# Patient Record
Sex: FEMALE | Race: BLACK OR AFRICAN AMERICAN | Employment: UNEMPLOYED | URBAN - METROPOLITAN AREA
[De-identification: names, ages, dates, MRNs, and addresses within clinical notes are randomized per-mention and may not be internally consistent; named-entity substitution may affect disease eponyms.]

---

## 2016-12-19 LAB
CHLAMYDIA, EXTERNAL: NEGATIVE
N. GONORRHEA, EXTERNAL: NEGATIVE

## 2017-01-16 LAB
HBSAG, EXTERNAL: NEGATIVE
HIV, EXTERNAL: NEGATIVE
RUBELLA, EXTERNAL: NORMAL

## 2017-02-06 ENCOUNTER — ROUTINE PRENATAL (OUTPATIENT)
Dept: MIDWIFE SERVICES | Age: 27
End: 2017-02-06

## 2017-02-06 VITALS
BODY MASS INDEX: 32.27 KG/M2 | HEIGHT: 64 IN | WEIGHT: 189 LBS | HEART RATE: 95 BPM | TEMPERATURE: 98.3 F | DIASTOLIC BLOOD PRESSURE: 81 MMHG | SYSTOLIC BLOOD PRESSURE: 127 MMHG

## 2017-02-06 DIAGNOSIS — Z34.02 ENCOUNTER FOR SUPERVISION OF NORMAL FIRST PREGNANCY IN SECOND TRIMESTER: Primary | ICD-10-CM

## 2017-02-06 PROBLEM — E55.9 VITAMIN D DEFICIENCY: Status: ACTIVE | Noted: 2017-02-06

## 2017-02-06 NOTE — PROGRESS NOTES
S: Feeling well. No significant complaints or concerns. Has not felt movement yet. No ctxs, LOF, or vaginal bldng. Nausea is getting much better. Has not been able to get into office for 7 weeks now. Her children have been sick. Every day she was scheduled, she had a child throwing up and had to cancel. She does not think there will be a problem getting in to the office regularly in the future. O: See prenatal flowsheet for maternal and fetal exam  Blood pressure 127/81, pulse 95, temperature 98.3 °F (36.8 °C), temperature source Oral, height 5' 4\" (1.626 m), weight 189 lb (85.7 kg), last menstrual period 10/10/2016, not currently breastfeeding. A:G10   17w0d   Size=Dates    P: Reviewed labs  20 week US scheduled  Discussed chronic hypertension/preeclampsia   Encouraged diet high in protein and to limit weight gain to 25 lbs  Encouraged exercise  Reviewed common pregnancy changes and discomfort as well as comfort measures.   See prenatal checklist for other topics covered during visit today  RTO in 4 weeks for CLAUDINE with KEVIN

## 2017-02-06 NOTE — MR AVS SNAPSHOT
Visit Information Date & Time Provider Department Dept. Phone Encounter #  
 2/6/2017 10:30 AM Sen Perez 6 [id number] Upcoming Health Maintenance Date Due  
 PAP AKA CERVICAL CYTOLOGY 1/11/2009 INFLUENZA AGE 9 TO ADULT 8/1/2016 Allergies as of 2/6/2017  Review Complete On: 12/19/2016 By: Patty Dancer, CNM No Known Allergies Current Immunizations  Never Reviewed Name Date Tdap 8/2/2014 11:03 AM  
  
 Not reviewed this visit Vitals BP Pulse Temp Height(growth percentile) Weight(growth percentile) LMP  
 127/81 95 98.3 °F (36.8 °C) (Oral) 5' 4\" (1.626 m) 189 lb (85.7 kg) 10/10/2016 (Approximate) Breastfeeding? BMI OB Status Smoking Status No 32.44 kg/m2 Having regular periods Never Smoker BMI and BSA Data Body Mass Index Body Surface Area  
 32.44 kg/m 2 1.97 m 2 Your Updated Medication List  
  
   
This list is accurate as of: 2/6/17 11:25 AM.  Always use your most recent med list.  
  
  
  
  
 aspirin 81 mg chewable tablet Take 81 mg by mouth daily. ibuprofen 800 mg tablet Commonly known as:  MOTRIN Take 1 Tab by mouth every eight (8) hours. * PRENATAL DHA+COMPLETE PRENATAL -300 mg-mcg-mg Cmpk Generic drug:  PNV no.24-iron-folic acid-dha Take 30 mg/day by mouth daily. Indications: PREGNANCY  
  
 * PRENATAL DHA+COMPLETE PRENATAL -300 mg-mcg-mg Cmpk Generic drug:  PNV no.24-iron-folic acid-dha Take  by mouth. * Notice: This list has 2 medication(s) that are the same as other medications prescribed for you. Read the directions carefully, and ask your doctor or other care provider to review them with you. Patient Instructions Vit D3 4,000 international units daily. Take with your largest meal. 
 
  
Introducing [facility] & HEALTH SERVICES!    
 Breanne Verduzco introduces For[employer] patient portal. Now you can access parts of your medical record, email your doctor's office, and request medication refills online. 1. In your internet browser, go to [email or web address] Xooker/[facility] 2. Click on the First Time User? Click Here link in the Sign In box. You will see the New Member Sign Up page. 3. Enter your 365Scores Access Code exactly as it appears below. You will not need to use this code after youve completed the sign-up process. If you do not sign up before the expiration date, you must request a new code. · [employer] Access Code: [email or web address] Expires: 5/7/2017 11:21 AM 
 
4. Enter the last four digits of your Social Security Number (xxxx) and Date of Birth (mm/dd/yyyy) as indicated and click Submit. You will be taken to the next sign-up page. 5. Create a 365Scores ID. This will be your 365Scores login ID and cannot be changed, so think of one that is secure and easy to remember. 6. Create a [employer]Scores password. You can change your password at any time. 7. Enter your Password Reset Question and Answer. This can be used at a later time if you forget your password. 8. Enter your e-mail address. You will receive e-mail notification when new information is available in [address] 9. Click Sign Up. You can now view and download portions of your medical record. 10. Click the Download Summary menu link to download a portable copy of your medical information. If you have questions, please visit the Frequently Asked Questions section of the [employer] website. Remember, 365Scores is NOT to be used for urgent needs. For medical emergencies, dial 911. Now available from your iPhone and Android! Please provide this summary of care documentation to your next provider. Your primary care clinician is listed as [place]. If you have any questions after today's visit, please call [phone].

## 2017-02-06 NOTE — PROGRESS NOTES
Chief Complaint   Patient presents with    Routine Prenatal Visit     17w0d     Visit Vitals    /81    Pulse 95    Temp 98.3 °F (36.8 °C) (Oral)    Ht 5' 4\" (1.626 m)    Wt 189 lb (85.7 kg)    Breastfeeding No    BMI 32.44 kg/m2     1. Have you been to the ER, urgent care clinic since your last visit? Hospitalized since your last visit? No    2. Have you seen or consulted any other health care providers outside of the [address] [place] [address] since your last visit? Include any pap smears or colon screening.  No

## 2017-03-06 ENCOUNTER — ROUTINE PRENATAL (OUTPATIENT)
Dept: MIDWIFE SERVICES | Age: 27
End: 2017-03-06

## 2017-03-06 ENCOUNTER — HOSPITAL ENCOUNTER (OUTPATIENT)
Dept: PERINATAL CARE | Age: 27
Discharge: HOME | End: 2017-03-06
Attending: OBSTETRICS & GYNECOLOGY
Payer: MEDICAID

## 2017-03-06 VITALS
WEIGHT: 195 LBS | TEMPERATURE: 98 F | HEIGHT: 64 IN | DIASTOLIC BLOOD PRESSURE: 82 MMHG | BODY MASS INDEX: 33.29 KG/M2 | HEART RATE: 87 BPM | SYSTOLIC BLOOD PRESSURE: 133 MMHG

## 2017-03-06 DIAGNOSIS — Z34.80 ENCOUNTER FOR SUPERVISION OF OTHER NORMAL PREGNANCY: Primary | ICD-10-CM

## 2017-03-06 DIAGNOSIS — O09.291 HX OF PRE-ECLAMPSIA IN PRIOR PREGNANCY, CURRENTLY PREGNANT, FIRST TRIMESTER: ICD-10-CM

## 2017-03-06 DIAGNOSIS — O10.011 BENIGN ESSENTIAL HTN, CHRONIC, ANTEPARTUM, FIRST TRIMESTER: ICD-10-CM

## 2017-03-06 PROCEDURE — 76805 OB US >/= 14 WKS SNGL FETUS: CPT | Performed by: OBSTETRICS & GYNECOLOGY

## 2017-03-06 RX ORDER — CHOLECALCIFEROL (VITAMIN D3) 125 MCG
4000 CAPSULE ORAL DAILY
COMMUNITY
End: 2017-09-11 | Stop reason: ALTCHOICE

## 2017-03-06 NOTE — PROGRESS NOTES
Chief Complaint   Patient presents with    Routine Prenatal Visit     21w0d     Visit Vitals    /82    Pulse 87    Temp 98 °F (36.7 °C) (Oral)    Ht 5' 4\" (1.626 m)    Wt 195 lb (88.5 kg)    BMI 33.47 kg/m2     1. Have you been to the ER, urgent care clinic since your last visit? Hospitalized since your last visit? No    2. Have you seen or consulted any other health care providers outside of the [address] [place] [address] since your last visit? Include any pap smears or colon screening.  No

## 2017-03-06 NOTE — PROGRESS NOTES
S: Feeling well. No significant complaints or concerns. Reports consistent, daily fetal mvmt. No ctxs, LOF, or vaginal bldng. O: See prenatal flowsheet for maternal and fetal exam  Blood pressure 133/82, pulse 87, temperature 98 °F (36.7 °C), temperature source Oral, height 5' 4\" (1.626 m), weight 195 lb (88.5 kg), last menstrual period 10/10/2016, is currently breastfeeding on occasion, on demand with 3year old. Continues taking daily baby ASA for hx of pre-e. Has anatomy scan following appointment today. A:G7   21w0d   Size=Dates    P: Breast pump order to be given at later visit.   See prenatal checklist for other topics covered during today's visit  RTO in 4  weeks for CLAUDINE with KEVIN

## 2017-03-06 NOTE — MR AVS SNAPSHOT
Visit Information Date & Time Provider Department Dept. Phone Encounter #  
 3/6/2017  1:00 PM Priscilla HardySen 6 [id number] Your Appointments 4/3/2017 10:30 AM  
OB VISIT with Dylan Banuelos CNM [address] ([facility]) Appt Note: ret ob  
 [address] [place] [address] [facility]  
[phone]  
  
   
 [address] [facility] [address] [facility] [address] Upcoming Health Maintenance Date Due  
 PAP AKA CERVICAL CYTOLOGY 1/11/2009 INFLUENZA AGE 9 TO ADULT 8/1/2016 Allergies as of 3/6/2017  Review Complete On: 3/6/2017 By: Priscilla Hardy CNM No Known Allergies Current Immunizations  Never Reviewed Name Date Tdap 8/2/2014 11:03 AM  
  
 Not reviewed this visit Vitals BP Pulse Temp Height(growth percentile) Weight(growth percentile) LMP  
 133/82 87 98 °F (36.7 °C) (Oral) 5' 4\" (1.626 m) 195 lb (88.5 kg) 10/10/2016 (Approximate) BMI OB Status Smoking Status 33.47 kg/m2 Pregnant Never Smoker BMI and BSA Data Body Mass Index Body Surface Area  
 33.47 kg/m 2 2 m 2 Your Updated Medication List  
  
   
This list is accurate as of: 3/6/17  1:36 PM.  Always use your most recent med list.  
  
  
  
  
 aspirin 81 mg chewable tablet Take 81 mg by mouth daily. * PRENATAL DHA+COMPLETE PRENATAL -300 mg-mcg-mg Cmpk Generic drug:  PNV no.24-iron-folic acid-dha Take 30 mg/day by mouth daily. Indications: PREGNANCY  
  
 * PRENATAL DHA+COMPLETE PRENATAL -300 mg-mcg-mg Cmpk Generic drug:  PNV no.24-iron-folic acid-dha Take  by mouth. VITAMIN D3 2,000 unit Tab Generic drug:  cholecalciferol (vitamin D3) Take 4,000 Int'l Units by mouth daily. * Notice: This list has 2 medication(s) that are the same as other medications prescribed for you.  Read the directions carefully, and ask your doctor or other care provider to review them with you. Introducing [facility] & HEALTH SERVICES! Cecilio Kwong introduces H[employer]ize patient portal. Now you can access parts of your medical record, email your doctor's office, and request medication refills online. 1. In your internet browser, go to [email or web address] Proxsys/[email or web address] 2. Click on the First Time User? Click Here link in the Sign In box. You will see the New Member Sign Up page. 3. Enter your Hatsize Access Code exactly as it appears below. You will not need to use this code after youve completed the sign-up process. If you do not sign up before the expiration date, you must request a new code. · Hatsize Access Code: [email or web address] Expires: 5/7/2017 11:21 AM 
 
4. Enter the last four digits of your Social Security Number (xxxx) and Date of Birth (mm/dd/yyyy) as indicated and click Submit. You will be taken to the next sign-up page. 5. Create a Hatsize ID. This will be your Hatsize login ID and cannot be changed, so think of one that is secure and easy to remember. 6. Create a Hatsize password. You can change your password at any time. 7. Enter your Password Reset Question and Answer. This can be used at a later time if you forget your password. 8. Enter your e-mail address. You will receive e-mail notification when new information is available in [address] 9. Click Sign Up. You can now view and download portions of your medical record. 10. Click the Download Summary menu link to download a portable copy of your medical information. If you have questions, please visit the Frequently Asked Questions section of the Hatsize website. Remember, Hatsize is NOT to be used for urgent needs. For medical emergencies, dial 911. Now available from your iPhone and Android! Please provide this summary of care documentation to your next provider. Your primary care clinician is listed as Javier Avendano.  If you have any questions after today's visit, please call [phone].

## 2017-03-23 NOTE — PROGRESS NOTES
I recommend we reassign her DAVID to 07/21/17. Recommendations: Follow-up scans as clinically indicated.

## 2017-04-10 ENCOUNTER — ROUTINE PRENATAL (OUTPATIENT)
Dept: MIDWIFE SERVICES | Age: 27
End: 2017-04-10

## 2017-04-10 VITALS
DIASTOLIC BLOOD PRESSURE: 82 MMHG | HEIGHT: 64 IN | SYSTOLIC BLOOD PRESSURE: 129 MMHG | WEIGHT: 205 LBS | BODY MASS INDEX: 35 KG/M2 | TEMPERATURE: 98.4 F

## 2017-04-10 DIAGNOSIS — Z34.82 ENCOUNTER FOR SUPERVISION OF OTHER NORMAL PREGNANCY IN SECOND TRIMESTER: Primary | ICD-10-CM

## 2017-04-10 NOTE — MR AVS SNAPSHOT
Visit Information Date & Time Provider Department Dept. Phone Encounter #  
 4/10/2017 10:00 AM Sen Carey [id number] Upcoming Health Maintenance Date Due  
 PAP AKA CERVICAL CYTOLOGY 1/11/2009 INFLUENZA AGE 9 TO ADULT 8/1/2016 Allergies as of 4/10/2017  Review Complete On: 4/10/2017 By: Yara Araya CNM No Known Allergies Current Immunizations  Never Reviewed Name Date Tdap 8/2/2014 11:03 AM  
  
 Not reviewed this visit You Were Diagnosed With   
  
 Codes Comments Encounter for supervision of other normal pregnancy in second trimester    -  Primary ICD-10-CM: Z34.82 
ICD-9-CM: V22.1 Vitals BP Temp Height(growth percentile) Weight(growth percentile) LMP BMI  
 129/82 98.4 °F (36.9 °C) (Oral) 5' 4\" (1.626 m) 205 lb (93 kg) 10/10/2016 (Approximate) 35.19 kg/m2 OB Status Smoking Status Pregnant Never Smoker BMI and BSA Data Body Mass Index Body Surface Area  
 35.19 kg/m 2 2.05 m 2 Your Updated Medication List  
  
   
This list is accurate as of: 4/10/17 10:43 AM.  Always use your most recent med list.  
  
  
  
  
 aspirin 81 mg chewable tablet Take 81 mg by mouth daily. * PRENATAL DHA+COMPLETE PRENATAL -300 mg-mcg-mg Cmpk Generic drug:  PNV no.24-iron-folic acid-dha Take 30 mg/day by mouth daily. Indications: PREGNANCY  
  
 * PRENATAL DHA+COMPLETE PRENATAL -300 mg-mcg-mg Cmpk Generic drug:  PNV no.24-iron-folic acid-dha Take  by mouth. VITAMIN D3 2,000 unit Tab Generic drug:  cholecalciferol (vitamin D3) Take 4,000 Int'l Units by mouth daily. * Notice: This list has 2 medication(s) that are the same as other medications prescribed for you. Read the directions carefully, and ask your doctor or other care provider to review them with you. Introducing [facility]! Daniela Laoiza introduces Triggertrap patient portal. Now you can access parts of your medical record, email your doctor's office, and request medication refills online. 1. In your internet browser, go to https://[employer]. [employer] 2. Click on the First Time User? Click Here link in the Sign In box. You will see the New Member Sign Up page. 3. Enter your Triggertrap Access Code exactly as it appears below. You will not need to use this code after youve completed the sign-up process. If you do not sign up before the expiration date, you must request a new code. · Trigg[employer]rap Access Code: [email or web address] Expires: 5/7/2017 12:21 PM 
 
4. Enter the last four digits of your Social Security Number (xxxx) and Date of Birth (mm/dd/yyyy) as indicated and click Submit. You will be taken to the next sign-up page. 5. Create a Triggertrap ID. This will be your Triggertrap login ID and cannot be changed, so think of one that is secure and easy to remember. 6. Create a Triggertrap password. You can change your password at any time. 7. Enter your Password Reset Question and Answer. This can be used at a later time if you forget your password. 8. Enter your e-mail address. You will receive e-mail notification when new information is available in [address] 9. Click Sign Up. You can now view and download portions of your medical record. 10. Click the Download Summary menu link to download a portable copy of your medical information. If you have questions, please visit the Frequently Asked Questions section of the Triggertrap website. Remember, Triggertrap is NOT to be used for urgent needs. For medical emergencies, dial 911. Now available from your iPhone and Android! Please provide this summary of care documentation to your next provider. Your primary care clinician is listed as Mary Jane Prado. If you have any questions after today's visit, please call [phone].

## 2017-04-10 NOTE — PROGRESS NOTES
S: Feeling well. No significant complaints or concerns. Consistent, daily fetal mvmt. No ctxs, LOF, or vaginal bldng. Really hoping she won't be induced this time. Would like to labor in the tub and avoid pitocin. Deliveries were quick, first 8 hours, others 3-4 hours. Still nauseated and throws up about 2 times a week. Does not want anything to help with this. O: See prenatal flowsheet for maternal and fetal exam  Blood pressure 129/82, temperature 98.4 °F (36.9 °C), temperature source Oral, height 5' 4\" (1.626 m), weight 205 lb (93 kg), last menstrual period 10/10/2016, not currently breastfeeding. A:G7   25w3d   Size=Dates    P: GTT next visit, instructions given  Tdap next visit  Discussion about hypertension/preeclampsia and why IOL is important for a healthy mom and baby if this developed again  Reviewed common pregnancy changes and discomfort as well as comfort measures.   See prenatal checklist for other topics covered during visit today  RTO in 3 weeks for CLAUDINE with KEVIN

## 2017-04-10 NOTE — PROGRESS NOTES
Chief Complaint   Patient presents with    Routine Prenatal Visit     25w3d     Visit Vitals    /82    Temp 98.4 °F (36.9 °C) (Oral)    Ht 5' 4\" (1.626 m)    Wt 205 lb (93 kg)    BMI 35.19 kg/m2     1. Have you been to the ER, urgent care clinic since your last visit? Hospitalized since your last visit? No    2. Have you seen or consulted any other health care providers outside of the [address] [place] [address] since your last visit? Include any pap smears or colon screening.  No

## 2017-06-19 ENCOUNTER — ROUTINE PRENATAL (OUTPATIENT)
Dept: MIDWIFE SERVICES | Age: 27
End: 2017-06-19

## 2017-06-19 VITALS
BODY MASS INDEX: 39.09 KG/M2 | DIASTOLIC BLOOD PRESSURE: 92 MMHG | TEMPERATURE: 97.9 F | HEART RATE: 89 BPM | HEIGHT: 64 IN | WEIGHT: 229 LBS | SYSTOLIC BLOOD PRESSURE: 134 MMHG

## 2017-06-19 DIAGNOSIS — Z36.85 ANTENATAL SCREENING FOR STREPTOCOCCUS B: ICD-10-CM

## 2017-06-19 DIAGNOSIS — O10.013 BENIGN ESSENTIAL HTN, CHRONIC, ANTEPARTUM, THIRD TRIMESTER: ICD-10-CM

## 2017-06-19 DIAGNOSIS — O09.293 HX OF PRE-ECLAMPSIA IN PRIOR PREGNANCY, CURRENTLY PREGNANT, THIRD TRIMESTER: Primary | ICD-10-CM

## 2017-06-19 LAB
BILIRUB UR QL STRIP: NEGATIVE
GLUCOSE UR-MCNC: NEGATIVE MG/DL
KETONES P FAST UR STRIP-MCNC: NEGATIVE MG/DL
PH UR STRIP: 6 [PH] (ref 4.6–8)
PROT UR QL STRIP: NEGATIVE MG/DL
SP GR UR STRIP: 1.02 (ref 1–1.03)
UA UROBILINOGEN AMB POC: NORMAL (ref 0.2–1)
URINALYSIS CLARITY POC: CLEAR
URINALYSIS COLOR POC: YELLOW
URINE BLOOD POC: NEGATIVE
URINE LEUKOCYTES POC: NEGATIVE
URINE NITRITES POC: NEGATIVE

## 2017-06-19 NOTE — PROGRESS NOTES
Chief Complaint   Patient presents with    Routine Prenatal Visit     35w3d     Visit Vitals    BP (!) 159/94    Pulse 89    Temp 97.9 °F (36.6 °C) (Oral)    Ht 5' 4\" (1.626 m)    Wt 229 lb (103.9 kg)    Breastfeeding Yes    BMI 39.31 kg/m2     1. Have you been to the ER, urgent care clinic since your last visit? Hospitalized since your last visit? No    2. Have you seen or consulted any other health care providers outside of the [address] [place] [address] since your last visit? Include any pap smears or colon screening.  No

## 2017-06-19 NOTE — PROGRESS NOTES
S: Feeling well. No significant complaints or concerns. Reports consistent, daily fetal mvmt. No ctxs, LOF, or vaginal bldng. Has had break in care due to lapse in insurance, but states has been taking BP at drug store stations and has been normal. Denies HA, or epigastric pain. O: See prenatal flowsheet for maternal and fetal exam  Blood pressure (!) 159/94, pulse 89, temperature 97.9 °F (36.6 °C), temperature source Oral, height 5' 4\" (1.626 m), weight 229 lb (103.9 kg), last menstrual period 10/10/2016, currently breastfeeding. Repeat BP sitting 134/92. Hx of pre-e with first IUP and GHTN with other 2 pregnancies. CHTN with current pregnancy based on BP at initial OB visit. Growth scan ordered at [facility] today and BPP scheduled d/t Chronic HTN. A:G7   35w3d   Size>Dates    P: GBS collected  Urine for protein negative today in office. PIH precautions reviewed. See prenatal checklist for other topics covered during today's visit  Consulted with Dr. Jannie Lutz and BPP ordered with [facility] today with growth scan  PIH labs and 24 hr urine for protein ordered.   POC reviewed with Montez Carl, Likely need IOL at 39 weeks for [facility]  RTO in 1 week for CLAUDINE with KEVIN

## 2017-06-20 LAB
BASOPHILS # BLD AUTO: 0 X10E3/UL (ref 0–0.2)
BASOPHILS NFR BLD AUTO: 0 %
BUN SERPL-MCNC: 9 MG/DL (ref 6–20)
BUN/CREAT SERPL: 16 (ref 9–23)
CALCIUM SERPL-MCNC: 9.3 MG/DL (ref 8.7–10.2)
CHLORIDE SERPL-SCNC: 101 MMOL/L (ref 96–106)
CO2 SERPL-SCNC: 23 MMOL/L (ref 18–29)
CREAT SERPL-MCNC: 0.55 MG/DL (ref 0.57–1)
CREAT UR-MCNC: 54.7 MG/DL
EOSINOPHIL # BLD AUTO: 0 X10E3/UL (ref 0–0.4)
EOSINOPHIL NFR BLD AUTO: 1 %
ERYTHROCYTE [DISTWIDTH] IN BLOOD BY AUTOMATED COUNT: 15.1 % (ref 12.3–15.4)
GLUCOSE SERPL-MCNC: 70 MG/DL (ref 65–99)
HCT VFR BLD AUTO: 34.8 % (ref 34–46.6)
HGB BLD-MCNC: 11.4 G/DL (ref 11.1–15.9)
IMM GRANULOCYTES # BLD: 0.1 X10E3/UL (ref 0–0.1)
IMM GRANULOCYTES NFR BLD: 1 %
LDH SERPL-CCNC: 200 IU/L (ref 119–226)
LYMPHOCYTES # BLD AUTO: 1.9 X10E3/UL (ref 0.7–3.1)
LYMPHOCYTES NFR BLD AUTO: 27 %
MCH RBC QN AUTO: 25.7 PG (ref 26.6–33)
MCHC RBC AUTO-ENTMCNC: 32.8 G/DL (ref 31.5–35.7)
MCV RBC AUTO: 79 FL (ref 79–97)
MONOCYTES # BLD AUTO: 0.7 X10E3/UL (ref 0.1–0.9)
MONOCYTES NFR BLD AUTO: 10 %
NEUTROPHILS # BLD AUTO: 4.4 X10E3/UL (ref 1.4–7)
NEUTROPHILS NFR BLD AUTO: 61 %
PLATELET # BLD AUTO: 128 X10E3/UL (ref 150–379)
POTASSIUM SERPL-SCNC: 4.4 MMOL/L (ref 3.5–5.2)
PROT UR-MCNC: 9.4 MG/DL
PROT/CREAT UR: 172 MG/G CREAT (ref 0–200)
RBC # BLD AUTO: 4.43 X10E6/UL (ref 3.77–5.28)
SODIUM SERPL-SCNC: 137 MMOL/L (ref 134–144)
WBC # BLD AUTO: 7.1 X10E3/UL (ref 3.4–10.8)

## 2017-06-23 LAB — B-HEM STREP SPEC QL CULT: NEGATIVE

## 2017-06-27 NOTE — PROGRESS NOTES
Didier Diggs was called and informed of test results. She was also informed that her  testing was too far in the future and an appointment was made for  instead of 7/3/17 as originally scheduled so she could have a consult with  center and f/u weekly testing due to her chronic HTN and slight thrombocytopenia. Please abstract and put in snapshot.  Thanks

## 2017-06-28 ENCOUNTER — HOSPITAL ENCOUNTER (OUTPATIENT)
Dept: PERINATAL CARE | Age: 27
Discharge: HOME | End: 2017-06-28
Attending: OBSTETRICS & GYNECOLOGY
Payer: MEDICAID

## 2017-06-28 PROCEDURE — 76818 FETAL BIOPHYS PROFILE W/NST: CPT | Performed by: OBSTETRICS & GYNECOLOGY

## 2017-06-28 PROCEDURE — 76816 OB US FOLLOW-UP PER FETUS: CPT | Performed by: OBSTETRICS & GYNECOLOGY

## 2017-06-29 LAB
ALBUMIN SERPL-MCNC: 3.9 G/DL (ref 3.5–5.5)
ALP SERPL-CCNC: 128 IU/L (ref 39–117)
ALT SERPL-CCNC: 13 IU/L (ref 0–32)
AST SERPL-CCNC: 14 IU/L (ref 0–40)
BILIRUB DIRECT SERPL-MCNC: 0.04 MG/DL (ref 0–0.4)
BILIRUB SERPL-MCNC: <0.2 MG/DL (ref 0–1.2)
PROT SERPL-MCNC: 6.2 G/DL (ref 6–8.5)
SPECIMEN STATUS REPORT, ROLRST: NORMAL

## 2017-07-01 LAB
CREAT UR-MCNC: 70.8 MG/DL
PROT UR-MCNC: 20.6 MG/DL
PROT/CREAT UR: 291 MG/G CREAT (ref 0–200)

## 2017-07-03 ENCOUNTER — ROUTINE PRENATAL (OUTPATIENT)
Dept: MIDWIFE SERVICES | Age: 27
End: 2017-07-03

## 2017-07-03 ENCOUNTER — HOSPITAL ENCOUNTER (OUTPATIENT)
Dept: PERINATAL CARE | Age: 27
Discharge: HOME | End: 2017-07-03
Attending: OBSTETRICS & GYNECOLOGY
Payer: MEDICAID

## 2017-07-03 VITALS
HEART RATE: 80 BPM | WEIGHT: 237 LBS | SYSTOLIC BLOOD PRESSURE: 144 MMHG | BODY MASS INDEX: 40.46 KG/M2 | TEMPERATURE: 98.7 F | DIASTOLIC BLOOD PRESSURE: 93 MMHG | HEIGHT: 64 IN

## 2017-07-03 DIAGNOSIS — O13.3 GESTATIONAL HTN, THIRD TRIMESTER: ICD-10-CM

## 2017-07-03 DIAGNOSIS — Z3A.37 37 WEEKS GESTATION OF PREGNANCY: ICD-10-CM

## 2017-07-03 DIAGNOSIS — Z23 NEED FOR TDAP VACCINATION: ICD-10-CM

## 2017-07-03 DIAGNOSIS — O09.293 HX OF PRE-ECLAMPSIA IN PRIOR PREGNANCY, CURRENTLY PREGNANT, THIRD TRIMESTER: ICD-10-CM

## 2017-07-03 DIAGNOSIS — O09.93 HIGH-RISK PREGNANCY IN THIRD TRIMESTER: ICD-10-CM

## 2017-07-03 DIAGNOSIS — O13.3 GESTATIONAL HTN, THIRD TRIMESTER: Primary | ICD-10-CM

## 2017-07-03 PROCEDURE — 76818 FETAL BIOPHYS PROFILE W/NST: CPT | Performed by: OBSTETRICS & GYNECOLOGY

## 2017-07-03 NOTE — PROGRESS NOTES
Reviewed results with patient at appointment today. She is to RTO in 2 days for Nurse visit for BP check and evaluation.

## 2017-07-03 NOTE — PROGRESS NOTES
Chief Complaint   Patient presents with    Routine Prenatal Visit     37w3d     Visit Vitals    BP (!) 144/93    Pulse 80    Temp 98.7 °F (37.1 °C) (Oral)    Ht 5' 4\" (1.626 m)    Wt 237 lb (107.5 kg)    BMI 40.68 kg/m2     1. Have you been to the ER, urgent care clinic since your last visit? Hospitalized since your last visit? No    2. Have you seen or consulted any other health care providers outside of the [facility] since your last visit? Include any pap smears or colon screening. No  After obtaining consent, and per orders of [employer] injection of TDAP given in left arm by Janet Pérez RN. Patient instructed to remain in clini for 20 minutes afterwards and to report any adverse reaction to me immediately.  Lot: [id number] Exp: 19 [id number]

## 2017-07-03 NOTE — MR AVS SNAPSHOT
Visit Information Date & Time Provider Department Dept. Phone Encounter #  
 7/3/2017 11:30 AM Citlalli MaciSen gaitan [id number] Follow-up Instructions Return for with RN for nurse visit. Natasha Chávez Upcoming Health Maintenance Date Due  
 PAP AKA CERVICAL CYTOLOGY 1/11/2009 OB 3RD TRIMESTER TDAP 4/21/2017 INFLUENZA AGE 9 TO ADULT 8/1/2017 Allergies as of 7/3/2017  Review Complete On: 7/3/2017 By: Citlalli Lux CNM No Known Allergies Current Immunizations  Never Reviewed Name Date Tdap 7/3/2017, 8/2/2014 11:03 AM  
  
 Not reviewed this visit You Were Diagnosed With   
  
 Codes Comments Gestational HTN, third trimester    -  Primary ICD-10-CM: O13.3 ICD-9-CM: 642.33 Hx of pre-eclampsia in prior pregnancy, currently pregnant, third trimester     ICD-10-CM: O09.293 ICD-9-CM: V23.49 High-risk pregnancy in third trimester     ICD-10-CM: O09.93 
ICD-9-CM: V23.9 37 weeks gestation of pregnancy     ICD-10-CM: Z3A.37 
ICD-9-CM: V22.2 Need for Tdap vaccination     ICD-10-CM: S06 ICD-9-CM: V06.1 Vitals BP Pulse Temp Height(growth percentile) Weight(growth percentile) LMP  
 (!) 144/93 80 98.7 °F (37.1 °C) (Oral) 5' 4\" (1.626 m) 237 lb (107.5 kg) 10/10/2016 (Approximate) BMI OB Status Smoking Status 40.68 kg/m2 Pregnant Never Smoker BMI and BSA Data Body Mass Index Body Surface Area  
 40.68 kg/m 2 2.2 m 2 Your Updated Medication List  
  
   
This list is accurate as of: 7/3/17 11:54 AM.  Always use your most recent med list.  
  
  
  
  
 aspirin 81 mg chewable tablet Take 81 mg by mouth daily. * PRENATAL DHA+COMPLETE PRENATAL -300 mg-mcg-mg Cmpk Generic drug:  [id number] ángel-folic-dha Take 30 mg/day by mouth daily. Indications: PREGNANCY  
  
 * PRENATAL DHA+COMPLETE PRENATAL -300 mg-mcg-mg Cmpk Generic drug:  [id number] ángel-folic-dha Take  by mouth. VITAMIN D3 2,000 unit Tab Generic drug:  cholecalciferol (vitamin D3) Take 4,000 Int'l Units by mouth daily. * Notice: This list has 2 medication(s) that are the same as other medications prescribed for you. Read the directions carefully, and ask your doctor or other care provider to review them with you. We Performed the Following CBC WITH AUTOMATED DIFF [88081 CPT(R)] METABOLIC PANEL, COMPREHENSIVE [32298 CPT(R)] SC IMMUNIZ ADMIN,1 SINGLE/COMB VAC/TOXOID [id number] CPT(R)] TETANUS, DIPHTHERIA TOXOIDS AND ACELLULAR PERTUSSIS VACCINE (TDAP), IN INDIVIDS. >=7, IM [id number] CPT(R)] Follow-up Instructions Return for with RN for nurse visit. Carlos Cook To-Do List   
 07/03/2017 Lab:  CREATININE, UR, 24 HR   
  
 07/03/2017 Lab:  LD   
  
 07/03/2017 Lab:  PROTEIN, URINE, 24 HR   
  
 07/03/2017 Lab:  URIC ACID Introducing [facility] & HEALTH SERVICES! [employer] introduces [employer] patient portal. Now you can access parts of your medical record, email your doctor's office, and request medication refills online. 1. In your internet browser, go to [email or web address] Prizzm/[employer] 2. Click on the First Time User? Click Here link in the Sign In box. You will see the New Member Sign Up page. 3. Enter your [employer] Access Code exactly as it appears below. You will not need to use this code after youve completed the sign-up process. If you do not sign up before the expiration date, you must request a new code. · [employer] Access Code: [email or web address] Expires: 9/17/2017 11:47 AM 
 
4. Enter the last four digits of your Social Security Number (xxxx) and Date of Birth (mm/dd/yyyy) as indicated and click Submit. You will be taken to the next sign-up page. 5. Create a [employer] Patterns ID. This will be your [employer]s login ID and cannot be changed, so think of one that is secure and easy to remember. 6. Create a [employer] password. You can change your password at any time. 7. Enter your Password Reset Question and Answer. This can be used at a later time if you forget your password. 8. Enter your e-mail address. You will receive e-mail notification when new information is available in [address] 9. Click Sign Up. You can now view and download portions of your medical record. 10. Click the Download Summary menu link to download a portable copy of your medical information. If you have questions, please visit the Frequently Asked Questions section of the [employer] website. Remember, [employer] is NOT to be used for urgent needs. For medical emergencies, dial 911. Now available from your iPhone and Android! Please provide this summary of care documentation to your next provider. Your primary care clinician is listed as Felton Fernandez. If you have any questions after today's visit, please call [phone].

## 2017-07-03 NOTE — PROGRESS NOTES
S: Feeling well. No significant complaints or concerns. Reports consistent, daily fetal mvmt. No ctxs, LOF, or vaginal bldng. Had NST/BPP completed in  center today. Dnies HA, [place] or epigastric pain. O: See prenatal flowsheet for maternal and fetal exam  Blood pressure (!) 144/93, pulse 80, temperature 98.7 °F (37.1 °C), temperature source Oral, height 5' 4\" (1.626 m), weight 237 lb (107.5 kg), last menstrual period 10/10/2016, currently breastfeeding. 3year old son. BP remains with mild elevation. Reviewed 24 hour urine and protein is 292. Reviewed results with Dr. Meagan Rubio. Discussed USPFT recommendation for Tdap during every pregnancy, optimal timing of administration between 27 to 36 weeks gestation-although can be done at any time during pregnancy, and that people that will be in close contact with her infant during the first year should also receive a Tdap vaccine if they have not previously received the Tdap vaccine. Pt states understanding. Pt accepts Tdap vaccine today. A:G7   37w3d   Size=Dates    P: Consult with Dr. Meagan Rubio  [facility] labs and 24 hour urine, if over 300 will induce now, if remains under and other labs stable, will plan IOL at 39 weeks gestation. Scheduled for 17 at 0630. See prenatal checklist for other topics covered during today's visit  RTO in 1 weeks for CLAUDINE with CNM. RTO in 2 days for nurse visit for BP check. PIH precautions, labor precautions and FKCS stressed.

## 2017-07-04 LAB
ALBUMIN SERPL-MCNC: 3.5 G/DL (ref 3.5–5.5)
ALBUMIN/GLOB SERPL: 1.5 {RATIO} (ref 1.2–2.2)
ALP SERPL-CCNC: 124 IU/L (ref 39–117)
ALT SERPL-CCNC: 13 IU/L (ref 0–32)
AST SERPL-CCNC: 18 IU/L (ref 0–40)
BASOPHILS # BLD AUTO: 0 X10E3/UL (ref 0–0.2)
BASOPHILS NFR BLD AUTO: 0 %
BILIRUB SERPL-MCNC: <0.2 MG/DL (ref 0–1.2)
BUN SERPL-MCNC: 9 MG/DL (ref 6–20)
BUN/CREAT SERPL: 16 (ref 9–23)
CALCIUM SERPL-MCNC: 9.1 MG/DL (ref 8.7–10.2)
CHLORIDE SERPL-SCNC: 101 MMOL/L (ref 96–106)
CO2 SERPL-SCNC: 21 MMOL/L (ref 18–29)
CREAT SERPL-MCNC: 0.55 MG/DL (ref 0.57–1)
EOSINOPHIL # BLD AUTO: 0.1 X10E3/UL (ref 0–0.4)
EOSINOPHIL NFR BLD AUTO: 1 %
ERYTHROCYTE [DISTWIDTH] IN BLOOD BY AUTOMATED COUNT: 15.3 % (ref 12.3–15.4)
GLOBULIN SER CALC-MCNC: 2.3 G/DL (ref 1.5–4.5)
GLUCOSE SERPL-MCNC: 67 MG/DL (ref 65–99)
HCT VFR BLD AUTO: 32.7 % (ref 34–46.6)
HGB BLD-MCNC: 10.7 G/DL (ref 11.1–15.9)
IMM GRANULOCYTES # BLD: 0.1 X10E3/UL (ref 0–0.1)
IMM GRANULOCYTES NFR BLD: 1 %
LYMPHOCYTES # BLD AUTO: 1.6 X10E3/UL (ref 0.7–3.1)
LYMPHOCYTES NFR BLD AUTO: 22 %
MCH RBC QN AUTO: 25.2 PG (ref 26.6–33)
MCHC RBC AUTO-ENTMCNC: 32.7 G/DL (ref 31.5–35.7)
MCV RBC AUTO: 77 FL (ref 79–97)
MONOCYTES # BLD AUTO: 0.8 X10E3/UL (ref 0.1–0.9)
MONOCYTES NFR BLD AUTO: 11 %
NEUTROPHILS # BLD AUTO: 4.6 X10E3/UL (ref 1.4–7)
NEUTROPHILS NFR BLD AUTO: 65 %
PLATELET # BLD AUTO: 132 X10E3/UL (ref 150–379)
POTASSIUM SERPL-SCNC: 4.4 MMOL/L (ref 3.5–5.2)
PROT SERPL-MCNC: 5.8 G/DL (ref 6–8.5)
RBC # BLD AUTO: 4.24 X10E6/UL (ref 3.77–5.28)
SODIUM SERPL-SCNC: 136 MMOL/L (ref 134–144)
WBC # BLD AUTO: 7.1 X10E3/UL (ref 3.4–10.8)

## 2017-07-05 ENCOUNTER — ROUTINE PRENATAL (OUTPATIENT)
Dept: MIDWIFE SERVICES | Age: 27
End: 2017-07-05

## 2017-07-05 ENCOUNTER — CLINICAL SUPPORT (OUTPATIENT)
Dept: MIDWIFE SERVICES | Age: 27
End: 2017-07-05

## 2017-07-05 ENCOUNTER — HOSPITAL ENCOUNTER (EMERGENCY)
Age: 27
Discharge: HOME | End: 2017-07-05
Attending: OBSTETRICS & GYNECOLOGY | Admitting: OBSTETRICS & GYNECOLOGY
Payer: MEDICAID

## 2017-07-05 VITALS — DIASTOLIC BLOOD PRESSURE: 95 MMHG | SYSTOLIC BLOOD PRESSURE: 153 MMHG

## 2017-07-05 VITALS
TEMPERATURE: 98.3 F | RESPIRATION RATE: 14 BRPM | HEART RATE: 87 BPM | DIASTOLIC BLOOD PRESSURE: 93 MMHG | SYSTOLIC BLOOD PRESSURE: 141 MMHG

## 2017-07-05 VITALS
DIASTOLIC BLOOD PRESSURE: 95 MMHG | WEIGHT: 238 LBS | BODY MASS INDEX: 40.63 KG/M2 | HEART RATE: 102 BPM | SYSTOLIC BLOOD PRESSURE: 153 MMHG | HEIGHT: 64 IN

## 2017-07-05 DIAGNOSIS — O10.013 BENIGN ESSENTIAL HYPERTENSION IN OBSTETRIC CONTEXT, THIRD TRIMESTER: Primary | ICD-10-CM

## 2017-07-05 DIAGNOSIS — O09.293 HX OF PRE-ECLAMPSIA IN PRIOR PREGNANCY, CURRENTLY PREGNANT, THIRD TRIMESTER: ICD-10-CM

## 2017-07-05 LAB
BILIRUB UR QL STRIP: NEGATIVE
CREAT UR-MCNC: 260.78 MG/DL
GLUCOSE UR-MCNC: NORMAL MG/DL
KETONES P FAST UR STRIP-MCNC: NEGATIVE MG/DL
PH UR STRIP: 6 [PH] (ref 4.6–8)
PROT UR QL STRIP: NORMAL MG/DL
PROT UR-MCNC: 62 MG/DL (ref 0–11.9)
PROT/CREAT UR-RTO: 0.2
SP GR UR STRIP: 1.03 (ref 1–1.03)
UA UROBILINOGEN AMB POC: NORMAL (ref 0.2–1)
URINALYSIS CLARITY POC: NORMAL
URINALYSIS COLOR POC: YELLOW
URINE BLOOD POC: NEGATIVE
URINE LEUKOCYTES POC: NEGATIVE
URINE NITRITES POC: NEGATIVE

## 2017-07-05 PROCEDURE — 84156 ASSAY OF PROTEIN URINE: CPT | Performed by: ADVANCED PRACTICE MIDWIFE

## 2017-07-05 PROCEDURE — 59025 FETAL NON-STRESS TEST: CPT | Performed by: ADVANCED PRACTICE MIDWIFE

## 2017-07-05 PROCEDURE — 99218 HC RM OBSERVATION: CPT

## 2017-07-05 PROCEDURE — 74011250637 HC RX REV CODE- 250/637: Performed by: ADVANCED PRACTICE MIDWIFE

## 2017-07-05 PROCEDURE — 99285 EMERGENCY DEPT VISIT HI MDM: CPT | Performed by: ADVANCED PRACTICE MIDWIFE

## 2017-07-05 RX ORDER — LABETALOL 100 MG/1
100 TABLET, FILM COATED ORAL 2 TIMES DAILY
Status: DISCONTINUED | OUTPATIENT
Start: 2017-07-05 | End: 2017-07-05 | Stop reason: HOSPADM

## 2017-07-05 RX ORDER — LABETALOL 100 MG/1
100 TABLET, FILM COATED ORAL 2 TIMES DAILY
Qty: 30 TAB | Refills: 0 | Status: SHIPPED | OUTPATIENT
Start: 2017-07-05 | End: 2017-07-16

## 2017-07-05 RX ADMIN — LABETALOL HCL 100 MG: 100 TABLET, FILM COATED ORAL at 12:17

## 2017-07-05 NOTE — IP AVS SNAPSHOT
Summary of Care Report The Summary of Care report has been created to help improve care coordination. Users with access to iCharts or [address] (Web-based application) may access additional patient information including the Discharge Summary. If you are not currently a [address] user and need more information, please call the number listed below in the [place] [address] section and ask to be connected with Medical Records. Facility Information Name Address Phone [address] [place] [id number] [phone] Patient Information Patient Name Sex  Marene Siemens ([id number]) Female 1990 Discharge Information Admitting Provider Service Area Unit Joy Brennan MD / [place]. [place] [facility] 2 Labor & Delivery / [phone] Discharge Provider Discharge Date/Time Discharge Disposition Destination (none) (none) (none) (none) Patient Language Language ENGLISH [13] Hospital Problems as of 2017  Reviewed: 2017 12:48 PM by Melvin Dominguez CNM None Non-Hospital Problems as of 2017  Reviewed: 2017 12:48 PM by Melvin Dominguez CNM Class Noted - Resolved Last Modified Active Problems Pregnancy  2014 - Present 2014 Entered by Chari Medina MD  
  Benign essential HTN, chronic, antepartum  2016 - Present 2016 by Melvin Dominguez CNM Entered by Melvin Dominguez CNM Hx of pre-eclampsia in prior pregnancy, currently pregnant  2016 - Present 2016 by Melvin Dominguez CNM Entered by Melvin Dominguez CNM Vitamin D deficiency  2017 - Present 2017 by Babita Boland CNM Entered by Babita Boalnd CNM Overview Signed 2017 11:35 AM by Babita Boland CNM 2/6 Begin 4000 international units of Vit D 3. You are allergic to the following No active allergies Current Discharge Medication List  
  
ASK your doctor about these medications Dose & Instructions Dispensing Information Comments  
 aspirin 81 mg chewable tablet Dose:  81 mg Take 81 mg by mouth daily. Refills:  0  
   
 * PRENATAL DHA+COMPLETE PRENATAL -300 mg-mcg-mg Cmpk Generic drug:  [id number] ángel-folic-dha  
 Dose:  30 mg/day Take 30 mg/day by mouth daily. Indications: PREGNANCY Refills:  0  
   
 * PRENATAL DHA+COMPLETE PRENATAL -300 mg-mcg-mg Cmpk Generic drug:  [id number] ángel-folic-dha Take  by mouth. Refills:  0  
   
 VITAMIN D3 2,000 unit Tab Generic drug:  cholecalciferol (vitamin D3) Dose:  4000 Int'l Units Take 4,000 Int'l Units by mouth daily. Refills:  0  
   
 * Notice: This list has 2 medication(s) that are the same as other medications prescribed for you. Read the directions carefully, and ask your doctor or other care provider to review them with you. Current Immunizations Name Date Tdap 7/3/2017, 8/2/2014 Follow-up Information Follow up With Details Comments Contact Info Kathi Naidu CNM Schedule an appointment as soon as possible for a visit in 2 days Blood Pressure Check [address] [facility] 510 [address] [facility] 
[phone] Rebecca Yadav MD Schedule an appointment as soon as possible for a visit in 5 days Pregnancy appointment/ [facility] [address] [place] [address] 
[phone] Discharge Instructions High Blood Pressure in Pregnancy: Care Instructions Your Care Instructions High blood pressure (hypertension) means that the force of blood against your artery walls is too strong. Mild high blood pressure during pregnancy is not usually dangerous.  Your doctor will probably just want to watch you closely. But when blood pressure is very high, it can reduce oxygen to your baby. This can affect how well your baby grows. High blood pressure also means that you are at higher risk for: · Preeclampsia. This is a problem that includes high blood pressure and damage to your liver or kidneys. It can also reduce how much oxygen your baby gets. In some cases, it leads to eclampsia. Eclampsia causes seizures. · Placental abruption. This is a problem when the placenta separates from the uterus before birth. It prevents the baby from getting enough oxygen and nutrients. Sometimes it can cause death for the baby and the mother. To prevent problems for you or your baby, you will have to check your blood pressure often. You will do this until after your baby is born. If your blood pressure rises suddenly or is very high during your pregnancy, your doctor may prescribe medicines. They can usually control blood pressure. If your blood pressure affects your or your baby's health, your doctor may need to deliver your baby early. After your baby is born, your blood pressure will probably improve. But sometimes blood pressure problems continue after birth. Follow-up care is a key part of your treatment and safety. Be sure to make and go to all appointments, and call your doctor if you are having problems. It's also a good idea to know your test results and keep a list of the medicines you take. How can you care for yourself at home? · Take and write down your blood pressure at home if your doctor tells you to. · Take your medicines exactly as prescribed. Call your doctor if you think you are having a problem with your medicine. · Do not smoke. If you need help quitting, talk to your doctor about stop-smoking programs and medicines. These can increase your chances of quitting for good. · Do not gain too much weight during your pregnancy.  Talk to your doctor about how much weight gain is healthy. · Eat a healthy diet. · Don't use a lot of salt. Take the salt shaker off the table. · Get regular, mild exercise. Walking or swimming several times a week can be healthy for you and your baby. · Reduce stress, and find time to relax. This is very important if you continue to work or have a busy schedule. It's also important if you have small children at home. When should you call for help? Call 911 anytime you think you may need emergency care. For example, call if: 
· You passed out (lost consciousness). · You have a seizure. Call your doctor now or seek immediate medical care if: 
· You have symptoms of preeclampsia, such as: 
¨ Sudden swelling of your face, hands, or feet. ¨ New vision problems (such as dimness or blurring). ¨ A severe headache. · Your blood pressure is higher than it should be or rises suddenly. · You have new nausea or vomiting. · You think that you are in labor. · You have new belly pain. Watch closely for changes in your health, and be sure to contact your doctor if: 
· You gain weight rapidly. Where can you learn more? Go to http://heidy-maria de jesus.info/. Enter [phone] in the search box to learn more about \"High Blood Pressure in Pregnancy: Care Instructions. \" Current as of: 2017 Content Version: 11.3 © 1589-4226 [employer]. Care instructions adapted under license by [employer] (which disclaims liability or warranty for this information). If you have questions about a medical condition or this instruction, always ask your healthcare professional. [place] [address] any warranty or liability for your use of this information. Pregnancy Precautions: Care Instructions Your Care Instructions There is no sure way to prevent labor before your due date ( labor) or to prevent most other pregnancy problems.  But there are things you can do to increase your chances of a healthy pregnancy. Go to your appointments, follow your doctor's advice, and take good care of yourself. Eat well, and exercise (if your doctor agrees). And make sure to drink plenty of water. Follow-up care is a key part of your treatment and safety. Be sure to make and go to all appointments, and call your doctor if you are having problems. It's also a good idea to know your test results and keep a list of the medicines you take. How can you care for yourself at home? · Make sure you go to your prenatal appointments. At each visit, your doctor will check your blood pressure. Your doctor will also check to see if you have protein in your urine. High blood pressure and protein in urine are signs of preeclampsia. This condition can be dangerous for you and your baby. · Drink plenty of fluids, enough so that your urine is light yellow or clear like water. Dehydration can cause contractions. If you have kidney, heart, or liver disease and have to limit fluids, talk with your doctor before you increase the amount of fluids you drink. · Tell your doctor right away if you notice any symptoms of an infection, such as: ¨ Burning when you urinate. ¨ A foul-smelling discharge from your vagina. ¨ Vaginal itching. ¨ Unexplained fever. ¨ Unusual pain or soreness in your uterus or lower belly. · Eat a balanced diet. Include plenty of foods that are high in calcium and iron. ¨ Foods high in calcium include milk, cheese, yogurt, almonds, and broccoli. ¨ Foods high in iron include red meat, shellfish, poultry, eggs, beans, raisins, whole-grain bread, and leafy green vegetables. · Do not smoke. If you need help quitting, talk to your doctor about stop-smoking programs and medicines. These can increase your chances of quitting for good. · Do not drink alcohol or use illegal drugs. · Follow your doctor's directions about activity.  Your doctor will let you know how much, if any, exercise you can do. · Ask your doctor if you can have sex. If you are at risk for early labor, your doctor may ask you to not have sex. · Take care to prevent falls. During pregnancy, your joints are loose, and your balance is off. Sports such as bicycling, skiing, or in-line skating can increase your risk of falling. And don't ride horses or motorcycles, dive, water ski, scuba dive, or parachute jump while you are pregnant. · Avoid getting very hot. Do not use saunas or hot tubs. Avoid staying out in the sun in hot weather for long periods. Take acetaminophen (Tylenol) to lower a high fever. · Do not take any over-the-counter or herbal medicines or supplements without talking to your doctor or pharmacist first. 
When should you call for help? Call 911 anytime you think you may need emergency care. For example, call if: 
· You passed out (lost consciousness). · You have severe vaginal bleeding. · You have severe pain in your belly or pelvis. · You have had fluid gushing or leaking from your vagina and you know or think the umbilical cord is bulging into your vagina. If this happens, immediately get down on your knees so your rear end (buttocks) is higher than your head. This will decrease the pressure on the cord until help arrives. Call your doctor now or seek immediate medical care if: 
· You have signs of preeclampsia, such as: 
¨ Sudden swelling of your face, hands, or feet. ¨ New vision problems (such as dimness or blurring). ¨ A severe headache. · You have any vaginal bleeding. · You have belly pain or cramping. · You have a fever. · You have had regular contractions (with or without pain) for an hour. This means that you have 8 or more within 1 hour or 4 or more in 20 minutes after you change your position and drink fluids. · You have a sudden release of fluid from your vagina. · You have low back pain or pelvic pressure that does not go away. · You notice that your baby has stopped moving or is moving much less than normal. 
Watch closely for changes in your health, and be sure to contact your doctor if you have any problems. Where can you learn more? Go to http://heidy-maria de jesus.info/. Enter [id number] in the search box to learn more about \"Pregnancy Precautions: Care Instructions. \" Current as of: March 16, 2017 Content Version: 11.3 © 5362-7896 [employer]. Care instructions adapted under license by [employer] (which disclaims liability or warranty for this information). If you have questions about a medical condition or this instruction, always ask your healthcare professional. [address] any warranty or liability for your use of this information. Counting Your Baby's Kicks: Care Instructions Your Care Instructions Counting your baby's kicks is one way your doctor can tell that your baby is healthy. Most womenespecially in a first pregnancyfeel their baby move for the first time between 16 and 22 weeks. The movement may feel like flutters rather than kicks. Your baby may move more at certain times of the day. When you are active, you may notice less kicking than when you are resting. At your prenatal visits, your doctor will ask whether the baby is active. In your last trimester, your doctor may ask you to count the number of times you feel your baby move. Follow-up care is a key part of your treatment and safety. Be sure to make and go to all appointments, and call your doctor if you are having problems. It's also a good idea to know your test results and keep a list of the medicines you take. How do you count fetal kicks? · A common method of checking your baby's movement is to count the number of kicks or moves you feel in 1 hour.  Ten movements (such as kicks, flutters, or rolls) in 1 hour are normal. Some doctors suggest that you count in the morning until you get to 10 movements. Then you can quit for that day and start again the next day. · Pick your baby's most active time of day to count. This may be any time from morning to evening. · If you do not feel 10 movements in an hour, your baby may be sleeping. Wait for the next hour and count again. When should you call for help? Call your doctor now or seek immediate medical care if: 
· You noticed that your baby has stopped moving or is moving much less than normal. 
Watch closely for changes in your health, and be sure to contact your doctor if you have any problems. Where can you learn more? Go to http://heidy-maria de jesus.info/. Enter D319 in the search box to learn more about \"Counting Your Baby's Kicks: Care Instructions. \" Current as of: March 16, 2017 Content Version: 11.3 © 6119-3218 [employer]. Care instructions adapted under license by [employer] (which disclaims liability or warranty for this information). If you have questions about a medical condition or this instruction, always ask your healthcare professional. [address] any warranty or liability for your use of this information. "[employer] Activation Thank you for enrolling in [address] Please follow the instructions below to securely access your online medical record. "[employer] allows you to send messages to your doctor, view your test results, renew your prescriptions, schedule appointments, and more. How Do I Sign Up? 1. In your internet browser, go to [email or web address]. 2. Click on the First Time User? Click Here link in the Sign In box. You will see the New Member Sign Up page. 3. Enter your "[employer] Access Code exactly as it appears below. You will not need to use this code after youve completed the sign-up process. If you do not sign up before the expiration date, you must request a new code. [facility] Access Code: [email or web address] Expires: 9/17/2017 11:47 AM  
 
4. Enter the last four digits of your Social Security Number (xxxx) and Date of Birth (mm/dd/yyyy) as indicated and click Submit. You will be taken to the next sign-up page. 5. Create a [employer] ID. This will be your [employer] login ID and cannot be changed, so think of one that is secure and easy to remember. 6. Create a [employer] password. You can change your password at any time. 7. Enter your Password Reset Question and Answer. This can be used at a later time if you forget your password. 8. Enter your e-mail address. You will receive e-mail notification when new information is available in [address] 9. Click Sign Up. You can now view your medical record. Additional Information Remember, [employer] is NOT to be used for urgent needs. For medical emergencies, dial 911. Now available from your iPhone and Android! Chart Review Routing History Recipient Method Report Sent By Rakan Hansen MD  
Fax: [phone] Phone: [phone] Fax Steven Torres MD NOTES AUTO ROUTING REPORT Anival Leal MD [[id number]] 7/31/2014  8:34 AM 07/31/2014 Nyasia Hansen MD  
Fax: [phone] Phone: [phone] Fax Steven Torres MD NOTES AUTO ROUTING REPORT Sabina Conrad MD [[id number]] 8/2/2014 10:21 AM 08/02/2014

## 2017-07-05 NOTE — IP AVS SNAPSHOT
[address] [facility] 
 
 
 [address] [facility] [address] [facility] 
[phone] Patient: Chick Flow 
MRN: [id number] JMA:[id number] You are allergic to the following No active allergies Recent Documentation OB Status Smoking Status Pregnant Never Smoker Emergency Contacts Name Discharge Info Relation Home Work Mobile Abrahan Esparza DISCHARGE CAREGIVER [3] Boyfriend [17] [phone] Abrahan Esparza  Other Relative [6] [phone] About your hospitalization You were admitted on:  July 5, 2017 You last received care in the:  [facility] 2 LABOR & DELIVERY You were discharged on:  July 5, 2017 Unit phone number:  [phone] Why you were hospitalized Your primary diagnosis was:  Not on File Providers Seen During Your Hospitalizations Provider Role Specialty Primary office phone Marina Johnson MD Attending Provider Obstetrics & Gynecology [phone] Your Primary Care Physician (PCP) Primary Care Physician Office Phone Office Fax Aimee Lemme [phone] Follow-up Information Follow up With Details Comments Contact Info Megan Fitzpatrick CNM Schedule an appointment as soon as possible for a visit in 2 days Blood Pressure Check [address] [facility] [address] 
[phone] Marina Johnson MD Schedule an appointment as soon as possible for a visit in 5 days Pregnancy appointment/ [facility] [address] 70 [facility] 
[phone] Current Discharge Medication List  
  
ASK your doctor about these medications Dose & Instructions Dispensing Information Comments Morning Noon Evening Bedtime  
 aspirin 81 mg chewable tablet Your last dose was: Your next dose is:    
   
   
 Dose:  81 mg Take 81 mg by mouth daily. Refills:  0  
     
   
   
   
  
 * PRENATAL DHA+COMPLETE PRENATAL -300 mg-mcg-mg Cmpk Generic drug:  [id number] ángel-folic-dha Your last dose was: Your next dose is:    
   
   
 Dose:  30 mg/day Take 30 mg/day by mouth daily. Indications: PREGNANCY Refills:  0  
     
   
   
   
  
 * PRENATAL DHA+COMPLETE PRENATAL -300 mg-mcg-mg Cmpk Generic drug:  [id number] ángel-folic-dha Your last dose was: Your next dose is: Take  by mouth. Refills:  0  
     
   
   
   
  
 VITAMIN D3 2,000 unit Tab Generic drug:  cholecalciferol (vitamin D3) Your last dose was: Your next dose is:    
   
   
 Dose:  4000 Int'l Units Take 4,000 Int'l Units by mouth daily. Refills:  0  
     
   
   
   
  
 * Notice: This list has 2 medication(s) that are the same as other medications prescribed for you. Read the directions carefully, and ask your doctor or other care provider to review them with you. Discharge Instructions High Blood Pressure in Pregnancy: Care Instructions Your Care Instructions High blood pressure (hypertension) means that the force of blood against your artery walls is too strong. Mild high blood pressure during pregnancy is not usually dangerous. Your doctor will probably just want to watch you closely. But when blood pressure is very high, it can reduce oxygen to your baby. This can affect how well your baby grows. High blood pressure also means that you are at higher risk for: · Preeclampsia. This is a problem that includes high blood pressure and damage to your liver or kidneys. It can also reduce how much oxygen your baby gets. In some cases, it leads to eclampsia. Eclampsia causes seizures. · Placental abruption. This is a problem when the placenta separates from the uterus before birth.  It prevents the baby from getting enough oxygen and nutrients. Sometimes it can cause death for the baby and the mother. To prevent problems for you or your baby, you will have to check your blood pressure often. You will do this until after your baby is born. If your blood pressure rises suddenly or is very high during your pregnancy, your doctor may prescribe medicines. They can usually control blood pressure. If your blood pressure affects your or your baby's health, your doctor may need to deliver your baby early. After your baby is born, your blood pressure will probably improve. But sometimes blood pressure problems continue after birth. Follow-up care is a key part of your treatment and safety. Be sure to make and go to all appointments, and call your doctor if you are having problems. It's also a good idea to know your test results and keep a list of the medicines you take. How can you care for yourself at home? · Take and write down your blood pressure at home if your doctor tells you to. · Take your medicines exactly as prescribed. Call your doctor if you think you are having a problem with your medicine. · Do not smoke. If you need help quitting, talk to your doctor about stop-smoking programs and medicines. These can increase your chances of quitting for good. · Do not gain too much weight during your pregnancy. Talk to your doctor about how much weight gain is healthy. · Eat a healthy diet. · Don't use a lot of salt. Take the salt shaker off the table. · Get regular, mild exercise. Walking or swimming several times a week can be healthy for you and your baby. · Reduce stress, and find time to relax. This is very important if you continue to work or have a busy schedule. It's also important if you have small children at home. When should you call for help? Call 911 anytime you think you may need emergency care. For example, call if: 
· You passed out (lost consciousness). · You have a seizure. Call your doctor now or seek immediate medical care if: 
· You have symptoms of preeclampsia, such as: 
¨ Sudden swelling of your face, hands, or feet. ¨ New vision problems (such as dimness or blurring). ¨ A severe headache. · Your blood pressure is higher than it should be or rises suddenly. · You have new nausea or vomiting. · You think that you are in labor. · You have new belly pain. Watch closely for changes in your health, and be sure to contact your doctor if: 
· You gain weight rapidly. Where can you learn more? Go to http://heidyAltaVitasmaria de jesus.info/. Enter [phone] in the search box to learn more about \"High Blood Pressure in Pregnancy: Care Instructions. \" Current as of: 2017 Content Version: 11.3 © 1059-0898 Umbrella Here. Care instructions adapted under license by [employer] (which disclaims liability or warranty for this information). If you have questions about a medical condition or this instruction, always ask your healthcare professional. [place] [address] any warranty or liability for your use of this information. Pregnancy Precautions: Care Instructions Your Care Instructions There is no sure way to prevent labor before your due date ( labor) or to prevent most other pregnancy problems. But there are things you can do to increase your chances of a healthy pregnancy. Go to your appointments, follow your doctor's advice, and take good care of yourself. Eat well, and exercise (if your doctor agrees). And make sure to drink plenty of water. Follow-up care is a key part of your treatment and safety. Be sure to make and go to all appointments, and call your doctor if you are having problems. It's also a good idea to know your test results and keep a list of the medicines you take. How can you care for yourself at home? · Make sure you go to your prenatal appointments.  At each visit, your doctor will check your blood pressure. Your doctor will also check to see if you have protein in your urine. High blood pressure and protein in urine are signs of preeclampsia. This condition can be dangerous for you and your baby. · Drink plenty of fluids, enough so that your urine is light yellow or clear like water. Dehydration can cause contractions. If you have kidney, heart, or liver disease and have to limit fluids, talk with your doctor before you increase the amount of fluids you drink. · Tell your doctor right away if you notice any symptoms of an infection, such as: ¨ Burning when you urinate. ¨ A foul-smelling discharge from your vagina. ¨ Vaginal itching. ¨ Unexplained fever. ¨ Unusual pain or soreness in your uterus or lower belly. · Eat a balanced diet. Include plenty of foods that are high in calcium and iron. ¨ Foods high in calcium include milk, cheese, yogurt, almonds, and broccoli. ¨ Foods high in iron include red meat, shellfish, poultry, eggs, beans, raisins, whole-grain bread, and leafy green vegetables. · Do not smoke. If you need help quitting, talk to your doctor about stop-smoking programs and medicines. These can increase your chances of quitting for good. · Do not drink alcohol or use illegal drugs. · Follow your doctor's directions about activity. Your doctor will let you know how much, if any, exercise you can do. · Ask your doctor if you can have sex. If you are at risk for early labor, your doctor may ask you to not have sex. · Take care to prevent falls. During pregnancy, your joints are loose, and your balance is off. Sports such as bicycling, skiing, or in-line skating can increase your risk of falling. And don't ride horses or motorcycles, dive, water ski, scuba dive, or parachute jump while you are pregnant. · Avoid getting very hot. Do not use saunas or hot tubs. Avoid staying out in the sun in hot weather for long periods.  Take acetaminophen (Tylenol) to lower a high fever. · Do not take any over-the-counter or herbal medicines or supplements without talking to your doctor or pharmacist first. 
When should you call for help? Call 911 anytime you think you may need emergency care. For example, call if: 
· You passed out (lost consciousness). · You have severe vaginal bleeding. · You have severe pain in your belly or pelvis. · You have had fluid gushing or leaking from your vagina and you know or think the umbilical cord is bulging into your vagina. If this happens, immediately get down on your knees so your rear end (buttocks) is higher than your head. This will decrease the pressure on the cord until help arrives. Call your doctor now or seek immediate medical care if: 
· You have signs of preeclampsia, such as: 
¨ Sudden swelling of your face, hands, or feet. ¨ New vision problems (such as dimness or blurring). ¨ A severe headache. · You have any vaginal bleeding. · You have belly pain or cramping. · You have a fever. · You have had regular contractions (with or without pain) for an hour. This means that you have 8 or more within 1 hour or 4 or more in 20 minutes after you change your position and drink fluids. · You have a sudden release of fluid from your vagina. · You have low back pain or pelvic pressure that does not go away. · You notice that your baby has stopped moving or is moving much less than normal. 
Watch closely for changes in your health, and be sure to contact your doctor if you have any problems. Where can you learn more? Go to http://heidy-maria de jesus.info/. Enter [phone] in the search box to learn more about \"Pregnancy Precautions: Care Instructions. \" Current as of: March 16, 2017 Content Version: 11.3 © 3632-4918 Tellus Technology.  Care instructions adapted under license by [employer] (which disclaims liability or warranty for this information). If you have questions about a medical condition or this instruction, always ask your healthcare professional. [address] any warranty or liability for your use of this information. Counting Your Baby's Kicks: Care Instructions Your Care Instructions Counting your baby's kicks is one way your doctor can tell that your baby is healthy. Most womenespecially in a first pregnancyfeel their baby move for the first time between 16 and 22 weeks. The movement may feel like flutters rather than kicks. Your baby may move more at certain times of the day. When you are active, you may notice less kicking than when you are resting. At your prenatal visits, your doctor will ask whether the baby is active. In your last trimester, your doctor may ask you to count the number of times you feel your baby move. Follow-up care is a key part of your treatment and safety. Be sure to make and go to all appointments, and call your doctor if you are having problems. It's also a good idea to know your test results and keep a list of the medicines you take. How do you count fetal kicks? · A common method of checking your baby's movement is to count the number of kicks or moves you feel in 1 hour. Ten movements (such as kicks, flutters, or rolls) in 1 hour are normal. Some doctors suggest that you count in the morning until you get to 10 movements. Then you can quit for that day and start again the next day. · Pick your baby's most active time of day to count. This may be any time from morning to evening. · If you do not feel 10 movements in an hour, your baby may be sleeping. Wait for the next hour and count again. When should you call for help?  
Call your doctor now or seek immediate medical care if: 
· You noticed that your baby has stopped moving or is moving much less than normal. 
Watch closely for changes in your health, and be sure to contact your doctor if you have any problems. Where can you learn more? Go to http://heidy-maria de jesus.info/. Enter E544 in the search box to learn more about \"Counting Your Baby's Kicks: Care Instructions. \" Current as of: March 16, 2017 Content Version: 11.3 © 1190-2121 Sway Medical Technologies. Care instructions adapted under license by ClearAccess (which disclaims liability or warranty for this information). If you have questions about a medical condition or this instruction, always ask your healthcare professional. [address] any warranty or liability for your use of this information. LaThermharBrightkite Activation Thank you for enrolling in [facility]. Please follow the instructions below to securely access your online medical record. Birchbox allows you to send messages to your doctor, view your test results, renew your prescriptions, schedule appointments, and more. How Do I Sign Up? 1. In your internet browser, go to [email or web address] 2. Click on the First Time User? Click Here link in the Sign In box. You will see the New Member Sign Up page. 3. Enter your Birchbox Access Code exactly as it appears below. You will not need to use this code after youve completed the sign-up process. If you do not sign up before the expiration date, you must request a new code. Birchbox Access Code: [email or web address] Expires: 9/17/2017 11:47 AM  
 
4. Enter the last four digits of your Social Security Number (xxxx) and Date of Birth (mm/dd/yyyy) as indicated and click Submit. You will be taken to the next sign-up page. 5. Create a Birchbox ID. This will be your Birchbox login ID and cannot be changed, so think of one that is secure and easy to remember. 6. Create a Birchbox password. You can change your password at any time. 7. Enter your Password Reset Question and Answer. This can be used at a later time if you forget your password. 8. Enter your e-mail address. You will receive e-mail notification when new information is available in [address] 9. Click Sign Up. You can now view your medical record. Additional Information Remember, [employer] is NOT to be used for urgent needs. For medical emergencies, dial 911. Now available from your iPhone and Android! Discharge Orders None Introducing [facility]! [facility] introduces [employer] patient portal. Now you can access parts of your medical record, email your doctor's office, and request medication refills online. 1. In your internet browser, go to [email or web address] 2. Click on the First Time User? Click Here link in the Sign In box. You will see the New Member Sign Up page. 3. Enter your [employer] Access Code exactly as it appears below. You will not need to use this code after youve completed the sign-up process. If you do not sign up before the expiration date, you must request a new code. · [employer] Access Code: [email or web address] Expires: 9/17/2017 11:47 AM 
 
4. Enter the last four digits of your Social Security Number (xxxx) and Date of Birth (mm/dd/yyyy) as indicated and click Submit. You will be taken to the next sign-up page. 5. Create a [employer] ID. This will be your [employer] login ID and cannot be changed, so think of one that is secure and easy to remember. 6. Create a [employer] password. You can change your password at any time. 7. Enter your Password Reset Question and Answer. This can be used at a later time if you forget your password. 8. Enter your e-mail address. You will receive e-mail notification when new information is available in [address] 9. Click Sign Up. You can now view and download portions of your medical record. 10. Click the Download Summary menu link to download a portable copy of your medical information.  
 
If you have questions, please visit the Frequently Asked Questions section of the [employer]. Remember, MyChart is NOT to be used for urgent needs. For medical emergencies, dial 911. Now available from your iPhone and Android! General Information Please provide this summary of care documentation to your next provider. Patient Signature:  ____________________________________________________________ Date:  ____________________________________________________________  
  
Melonie Rhodes Provider Signature:  ____________________________________________________________ Date:  ____________________________________________________________

## 2017-07-05 NOTE — PROGRESS NOTES
Recommendations:  -Obtain 24-hour total protein results. If total protein is greater than 300 mg, the diagnosis is consistent with  preeclampsia and the patient should be delivered. -If there is no evidence of preeclampsia, I recommend CBC and comp panel now. -Follow-up blood pressures on 07/05/17 at your office.

## 2017-07-05 NOTE — PROGRESS NOTES
Chief Complaint   Patient presents with    Pregnancy Problem     blood pressure check     Visit Vitals    BP (!) 153/95    Pulse (!) 102    Ht 5' 4\" (1.626 m)    Wt 238 lb (108 kg)    LMP 10/10/2016 (Approximate)    BMI 40.85 kg/m2     1. Have you been to the ER, urgent care clinic since your last visit? Hospitalized since your last visit? No    2. Have you seen or consulted any other health care providers outside of the [facility] since your last visit? Include any pap smears or colon screening. No     Thank you for choosing [facility]. We know you have many options when it comes to your healthcare; we appreciate that you picked us. Our goal is to provide exceptional service and world class care for every patient. You may receive a survey in the mail or by email asking for your feedback. Please take a few minutes to share your thoughts about your recent visit. Your comments helps us understand what we do well and what we can do better. To ensure confidentiality, this survey is administered by an independent third-party, [address] [place] [address] participation will help us to continue and improve the quality of care that we provide to you, your family, friends, and neighbors. Thank you!

## 2017-07-05 NOTE — DISCHARGE INSTRUCTIONS
High Blood Pressure in Pregnancy: Care Instructions  Your Care Instructions  High blood pressure (hypertension) means that the force of blood against your artery walls is too strong. Mild high blood pressure during pregnancy is not usually dangerous. Your doctor will probably just want to watch you closely. But when blood pressure is very high, it can reduce oxygen to your baby. This can affect how well your baby grows. High blood pressure also means that you are at higher risk for:  · Preeclampsia. This is a problem that includes high blood pressure and damage to your liver or kidneys. It can also reduce how much oxygen your baby gets. In some cases, it leads to eclampsia. Eclampsia causes seizures. · Placental abruption. This is a problem when the placenta separates from the uterus before birth. It prevents the baby from getting enough oxygen and nutrients. Sometimes it can cause death for the baby and the mother. To prevent problems for you or your baby, you will have to check your blood pressure often. You will do this until after your baby is born. If your blood pressure rises suddenly or is very high during your pregnancy, your doctor may prescribe medicines. They can usually control blood pressure. If your blood pressure affects your or your baby's health, your doctor may need to deliver your baby early. After your baby is born, your blood pressure will probably improve. But sometimes blood pressure problems continue after birth. Follow-up care is a key part of your treatment and safety. Be sure to make and go to all appointments, and call your doctor if you are having problems. It's also a good idea to know your test results and keep a list of the medicines you take. How can you care for yourself at home? · Take and write down your blood pressure at home if your doctor tells you to. · Take your medicines exactly as prescribed.  Call your doctor if you think you are having a problem with your medicine. · Do not smoke. If you need help quitting, talk to your doctor about stop-smoking programs and medicines. These can increase your chances of quitting for good. · Do not gain too much weight during your pregnancy. Talk to your doctor about how much weight gain is healthy. · Eat a healthy diet. · Don't use a lot of salt. Take the salt shaker off the table. · Get regular, mild exercise. Walking or swimming several times a week can be healthy for you and your baby. · Reduce stress, and find time to relax. This is very important if you continue to work or have a busy schedule. It's also important if you have small children at home. When should you call for help? Call 911 anytime you think you may need emergency care. For example, call if:  · You passed out (lost consciousness). · You have a seizure. Call your doctor now or seek immediate medical care if:  · You have symptoms of preeclampsia, such as:  ¨ Sudden swelling of your face, hands, or feet. ¨ New vision problems (such as dimness or blurring). ¨ A severe headache. · Your blood pressure is higher than it should be or rises suddenly. · You have new nausea or vomiting. · You think that you are in labor. · You have new belly pain. Watch closely for changes in your health, and be sure to contact your doctor if:  · You gain weight rapidly. Where can you learn more? Go to http://heidy-maria de jesus.info/. Enter [phone] in the search box to learn more about \"High Blood Pressure in Pregnancy: Care Instructions. \"  Current as of: March 16, 2017  Content Version: 11.3  © 2806-9509 [employer]. Care instructions adapted under license by [employer] (which disclaims liability or warranty for this information).  If you have questions about a medical condition or this instruction, always ask your healthcare professional. [address] any warranty or liability for your use of this information. Pregnancy Precautions: Care Instructions  Your Care Instructions  There is no sure way to prevent labor before your due date ( labor) or to prevent most other pregnancy problems. But there are things you can do to increase your chances of a healthy pregnancy. Go to your appointments, follow your doctor's advice, and take good care of yourself. Eat well, and exercise (if your doctor agrees). And make sure to drink plenty of water. Follow-up care is a key part of your treatment and safety. Be sure to make and go to all appointments, and call your doctor if you are having problems. It's also a good idea to know your test results and keep a list of the medicines you take. How can you care for yourself at home? · Make sure you go to your prenatal appointments. At each visit, your doctor will check your blood pressure. Your doctor will also check to see if you have protein in your urine. High blood pressure and protein in urine are signs of preeclampsia. This condition can be dangerous for you and your baby. · Drink plenty of fluids, enough so that your urine is light yellow or clear like water. Dehydration can cause contractions. If you have kidney, heart, or liver disease and have to limit fluids, talk with your doctor before you increase the amount of fluids you drink. · Tell your doctor right away if you notice any symptoms of an infection, such as:  ¨ Burning when you urinate. ¨ A foul-smelling discharge from your vagina. ¨ Vaginal itching. ¨ Unexplained fever. ¨ Unusual pain or soreness in your uterus or lower belly. · Eat a balanced diet. Include plenty of foods that are high in calcium and iron. ¨ Foods high in calcium include milk, cheese, yogurt, almonds, and broccoli. ¨ Foods high in iron include red meat, shellfish, poultry, eggs, beans, raisins, whole-grain bread, and leafy green vegetables. · Do not smoke.  If you need help quitting, talk to your doctor about stop-smoking programs and medicines. These can increase your chances of quitting for good. · Do not drink alcohol or use illegal drugs. · Follow your doctor's directions about activity. Your doctor will let you know how much, if any, exercise you can do. · Ask your doctor if you can have sex. If you are at risk for early labor, your doctor may ask you to not have sex. · Take care to prevent falls. During pregnancy, your joints are loose, and your balance is off. Sports such as bicycling, skiing, or in-line skating can increase your risk of falling. And don't ride horses or motorcycles, dive, water ski, scuba dive, or parachute jump while you are pregnant. · Avoid getting very hot. Do not use saunas or hot tubs. Avoid staying out in the sun in hot weather for long periods. Take acetaminophen (Tylenol) to lower a high fever. · Do not take any over-the-counter or herbal medicines or supplements without talking to your doctor or pharmacist first.  When should you call for help? Call 911 anytime you think you may need emergency care. For example, call if:  · You passed out (lost consciousness). · You have severe vaginal bleeding. · You have severe pain in your belly or pelvis. · You have had fluid gushing or leaking from your vagina and you know or think the umbilical cord is bulging into your vagina. If this happens, immediately get down on your knees so your rear end (buttocks) is higher than your head. This will decrease the pressure on the cord until help arrives. Call your doctor now or seek immediate medical care if:  · You have signs of preeclampsia, such as:  ¨ Sudden swelling of your face, hands, or feet. ¨ New vision problems (such as dimness or blurring). ¨ A severe headache. · You have any vaginal bleeding. · You have belly pain or cramping. · You have a fever. · You have had regular contractions (with or without pain) for an hour.  This means that you have 8 or more within 1 hour or 4 or more in 20 minutes after you change your position and drink fluids. · You have a sudden release of fluid from your vagina. · You have low back pain or pelvic pressure that does not go away. · You notice that your baby has stopped moving or is moving much less than normal.  Watch closely for changes in your health, and be sure to contact your doctor if you have any problems. Where can you learn more? Go to http://heidy-maria de jesus.info/. Enter [id number] in the search box to learn more about \"Pregnancy Precautions: Care Instructions. \"  Current as of: March 16, 2017  Content Version: 11.3  © 5968-4571 [employer]. Care instructions adapted under license by [employer] (which disclaims liability or warranty for this information). If you have questions about a medical condition or this instruction, always ask your healthcare professional. [address] any warranty or liability for your use of this information. Counting Your Baby's Kicks: Care Instructions  Your Care Instructions  Counting your baby's kicks is one way your doctor can tell that your baby is healthy. Most women--especially in a first pregnancy--feel their baby move for the first time between 16 and 22 weeks. The movement may feel like flutters rather than kicks. Your baby may move more at certain times of the day. When you are active, you may notice less kicking than when you are resting. At your prenatal visits, your doctor will ask whether the baby is active. In your last trimester, your doctor may ask you to count the number of times you feel your baby move. Follow-up care is a key part of your treatment and safety. Be sure to make and go to all appointments, and call your doctor if you are having problems. It's also a good idea to know your test results and keep a list of the medicines you take. How do you count fetal kicks?   · A common method of checking your baby's movement is to count the number of kicks or moves you feel in 1 hour. Ten movements (such as kicks, flutters, or rolls) in 1 hour are normal. Some doctors suggest that you count in the morning until you get to 10 movements. Then you can quit for that day and start again the next day. · Pick your baby's most active time of day to count. This may be any time from morning to evening. · If you do not feel 10 movements in an hour, your baby may be sleeping. Wait for the next hour and count again. When should you call for help? Call your doctor now or seek immediate medical care if:  · You noticed that your baby has stopped moving or is moving much less than normal.  Watch closely for changes in your health, and be sure to contact your doctor if you have any problems. Where can you learn more? Go to http://heidy-maria de jesus.info/. Enter J518 in the search box to learn more about \"Counting Your Baby's Kicks: Care Instructions. \"  Current as of: March 16, 2017  Content Version: 11.3  © 2127-1132 [employer]. Care instructions adapted under license by [employer] (which disclaims liability or warranty for this information). If you have questions about a medical condition or this instruction, always ask your healthcare professional. [address] any warranty or liability for your use of this information. [employer] Activation    Thank you for enrolling in [address] Please follow the instructions below to securely access your online medical record. [employer] allows you to send messages to your doctor, view your test results, renew your prescriptions, schedule appointments, and more. How Do I Sign Up? 1. In your internet browser, go to [email or web address]. 2. Click on the First Time User? Click Here link in the Sign In box. You will see the New Member Sign Up page. 3. Enter your [employer] Access Code exactly as it appears below.  You will not need to use this code after youve completed the sign-up process. If you do not sign up before the expiration date, you must request a new code. [facility] Access Code: [email or web address]  Expires: 9/17/2017 11:47 AM     4. Enter the last four digits of your Social Security Number (xxxx) and Date of Birth (mm/dd/yyyy) as indicated and click Submit. You will be taken to the next sign-up page. 5. Create a [facility] ID. This will be your [facility] login ID and cannot be changed, so think of one that is secure and easy to remember. 6. Create a [facility] password. You can change your password at any time. 7. Enter your Password Reset Question and Answer. This can be used at a later time if you forget your password. 8. Enter your e-mail address. You will receive e-mail notification when new information is available in [address] 9. Click Sign Up. You can now view your medical record. Additional Information    Remember, [facility] is NOT to be used for urgent needs. For medical emergencies, dial 911. Now available from your iPhone and Android!

## 2017-07-05 NOTE — H&P
History & Physical    Name: Marley Gonsalez MRN: [id number]  SSN: [id number]    YOB: 1990  Age: 32 y.o. Sex: female        Subjective: Nahun Mantilla is admitted for OBS for elevated BP noted in clinic for protein creatinine ratio sample. She denies HA, scotoma or epigastric pain. She endorses good fetal movement. She denies any regular contractions or loss of fluid or vaginal bleeding. She had blood work completed 2 days ago and her Platelets improved. There remainder of her labs are stable. Her 24 hour urine for protein was 291. I consulted with Dr. Shreyas Sim and Nahun Mantilla was started on Labetalol 10 mg po BID today. Estimated Date of Delivery: 17  OB History    Para Term  AB Living   7 3 2 1 3 3   SAB TAB Ectopic Molar Multiple Live Births   3 0 0  0 3      # Outcome Date GA Lbr Max/2nd Weight Sex Delivery Anes PTL Lv   7 Current            6 Term 14 37w3d 03:13 / 00:10 6 lb 1.4 oz (2.76 kg) M VAGINAL DELI None N MALOU   5 SAB 13           4 Term 09/22/10 38w0d   F VAGINAL DELI EPI  MALOU   3 SAB 09           2  08 35w0d   M VAGINAL DELI EPI  MALOU      Complications: Preeclampsia   1 SAB                   Ms. Ganesh Frances is admitted with pregnancy at 37w6d for evaluation for pre-e vs gestational HTN. Prenatal course was complicated by chronic hypertension, elevated blood pressure in physician's office  and gestational HTN. Please see prenatal records for details. Past Medical History:   Diagnosis Date    Abnormal Pap smear     abdnormal at 1st PNV will f/u after preg.  Anemia     Essential hypertension     CHTN    Hyperhidrosis     Hypertension     Preeclampsia      No past surgical history on file. Social History     Occupational History    Not on file.      Social History Main Topics    Smoking status: Never Smoker    Smokeless tobacco: Never Used    Alcohol use No    Drug use: No    Sexual activity: Yes     Partners: Male     Birth control/ protection: None     Family History   Problem Relation Age of Onset    Diabetes Mother     Breast Cancer Maternal Aunt     Diabetes Maternal Grandmother     Heart Disease Mother     Cancer Mother      No Known Allergies  Prior to Admission medications    Medication Sig Start Date End Date Taking? Authorizing Provider   cholecalciferol, vitamin D3, (VITAMIN D3) 2,000 unit tab Take 4,000 Int'l Units by mouth daily. Yes Historical Provider   PNV no.24-iron-folic acid-dha (PRENATAL DHA+COMPLETE PRENATAL) -300 mg-mcg-mg cmpk Take  by mouth. Yes Historical Provider   aspirin 81 mg chewable tablet Take 81 mg by mouth daily. Yes Historical Provider   PNV no.24-iron-folic acid-dha (PRENATAL DHA+COMPLETE PRENATAL) -300 mg-mcg-mg cmpk Take 30 mg/day by mouth daily. Indications: PREGNANCY   Yes Historical Provider        Review of Systems    Objective:     Vitals:  Vitals:    07/05/17 1201 07/05/17 1217 07/05/17 1232 07/05/17 1247   BP: (!) 161/98 155/83 146/87 (!) 147/93   Pulse: 94 93 88 87   Resp: 16      Temp: 98.1 °F (36.7 °C)           Physical Exam   Lungs - CTA  Cardiac- RRR  Abdomen- soft, nttp, gravid, EFW 6.5 lbs  Extremities - no edema normal reflexes     Cervical Exam: 1 cm dilated    50% effaced    -1 station    Presenting Part: cephalic  Cervical Position: posterior  Consistency: Medium  Uterine Activity: rare  Membranes: Intact  Fetal Heart Rate: Reactive  Baseline: 150 per minute  Variability: moderate  Accelerations: yes    Prenatal Labs:   Lab Results   Component Value Date/Time    Rubella, External Imnnue 02/25/2014    GrBStrep, External NEGATIVE 06/23/2017    HBsAg, External Negative 02/25/2014    HIV, External negative 02/25/2014    RPR, External Non-reacvtive 02/25/2014    Gonorrhea, External negative 02/25/2014    Chlamydia, External negative 02/25/2014    ABO,Rh A positive 02/25/2014          Impression/Plan:     Gestational HTN     Plan: Admit for obsertvation.  Group B Strep was negative.   Protein creat 0.2   May discharge to home   F/u with BPP on 7/10/17  BP check in office on 7/07/17    Signed By:  Rebel Nelson CNM     July 5, 2017

## 2017-07-05 NOTE — PROGRESS NOTES
1200 - Patient arrived ambulatory from midwives office. Reports increased BP today in office. Reports idalia raymond contractions, cervix was not checked at office visit today or Monday. Patient is scheduled to be induced 7/14. Patient has a history of HTN and Preeclampsia in pregnancy. Patient voided 50mL. Given 800mL ice water PO. Placed on EFM. Serial BP initiated. Denies HA, visual disturbances, increased swelling or RUQ pain. Patient denies LOF or vaginal bleeding. Reports +FM. Urine collected for PCR and sent to lab. TORB received to administer 100mg labetalol BID including NOW, will administer when pharmacy verifies. Patient has no additional questions at this time. 1235 - PCR results back, updated KAnuradha Shaikh Dryalivia, will discuss POC with patient. 1300 - JESSICA Shaikh Dryalivia at bedside discussing POC. VORB to discharge patient. Will send labetalol prescription to pharmacy. F/U Friday in office for BP check and BPP F/U 7/10, plan for induction 7/14. CNM reviewed FHR tracing and Blood Pressure readings, approves discharge at this time. 1335 - I have reviewed discharge instructions with the patient and spouse including prescriptions, follow-up appointments, high blood pressure in pregnancy, pregnancy precautions, kick counts and mychart, . The patient and spouse verbalized understanding and no further questions at this time. 1345 - Patient and family ambulated off unit at this time with belongings and discharge instructions.

## 2017-07-05 NOTE — PROGRESS NOTES
S: Feeling well. No significant complaints or concerns. Reports consistent, daily fetal mvmt. No ctxs, LOF, or vaginal bldng. Denies HA, scotoma or epigastric pain, has two kids with her today. O: See prenatal flowsheet for maternal and fetal exam  Last menstrual period 10/10/2016, currently breastfeeding. BP elevation noted again today. +30 protein in dip stick today. A:G7   37w5d   Size=Dates    P: Consult with Dr. Lottie Campa today. Will send to LD today and get P/C ratio, if elevated will IOL today. Will also start on Labetalol 100 mg PO BID today and continue through delivery. See prenatal checklist for other topics covered during today's visit  To LD for OBS admission.

## 2017-07-07 ENCOUNTER — CLINICAL SUPPORT (OUTPATIENT)
Dept: MIDWIFE SERVICES | Age: 27
End: 2017-07-07

## 2017-07-07 VITALS
HEART RATE: 89 BPM | SYSTOLIC BLOOD PRESSURE: 139 MMHG | WEIGHT: 239 LBS | BODY MASS INDEX: 40.8 KG/M2 | DIASTOLIC BLOOD PRESSURE: 88 MMHG | HEIGHT: 64 IN

## 2017-07-07 DIAGNOSIS — Z01.30 BLOOD PRESSURE CHECK: Primary | ICD-10-CM

## 2017-07-07 NOTE — PROGRESS NOTES
Chief Complaint   Patient presents with    Hypertension     here for bp check     Visit Vitals    /88    Pulse 89    Ht 5' 4\" (1.626 m)    Wt 239 lb (108.4 kg)    BMI 41.02 kg/m2     Here today for BP check only. Blood pressure reading was reviewed by Emily Zhu CNM and patient is continue medication as prescribed and follow up with dr Isabella Rasheed Monday for a BPP ultrasound.

## 2017-07-10 ENCOUNTER — ROUTINE PRENATAL (OUTPATIENT)
Dept: MIDWIFE SERVICES | Age: 27
End: 2017-07-10

## 2017-07-10 VITALS
WEIGHT: 237 LBS | HEIGHT: 64 IN | BODY MASS INDEX: 40.46 KG/M2 | DIASTOLIC BLOOD PRESSURE: 87 MMHG | HEART RATE: 95 BPM | SYSTOLIC BLOOD PRESSURE: 142 MMHG

## 2017-07-10 DIAGNOSIS — O09.93 ENCOUNTER FOR SUPERVISION OF HIGH RISK PREGNANCY IN THIRD TRIMESTER, ANTEPARTUM: ICD-10-CM

## 2017-07-10 DIAGNOSIS — Z3A.38 38 WEEKS GESTATION OF PREGNANCY: ICD-10-CM

## 2017-07-10 DIAGNOSIS — I15.9 SECONDARY HYPERTENSION: Primary | ICD-10-CM

## 2017-07-10 LAB
BILIRUB UR QL STRIP: NEGATIVE
GLUCOSE UR-MCNC: NEGATIVE MG/DL
KETONES P FAST UR STRIP-MCNC: NEGATIVE MG/DL
PH UR STRIP: 6 [PH] (ref 4.6–8)
PROT UR QL STRIP: NORMAL MG/DL
SP GR UR STRIP: 1.03 (ref 1–1.03)
UA UROBILINOGEN AMB POC: NORMAL (ref 0.2–1)
URINALYSIS CLARITY POC: CLEAR
URINALYSIS COLOR POC: YELLOW
URINE BLOOD POC: NEGATIVE
URINE LEUKOCYTES POC: NEGATIVE
URINE NITRITES POC: NEGATIVE

## 2017-07-10 NOTE — PROGRESS NOTES
Chief Complaint   Patient presents with    Ultrasound     BPP per [facility]     Visit Vitals    /87    Pulse 95    Ht 5' 4\" (1.626 m)    Wt 237 lb (107.5 kg)    BMI 40.68 kg/m2       1. Have you been to the ER, urgent care clinic since your last visit? Hospitalized since your last visit? No    2. Have you seen or consulted any other health care providers outside of the [address] [place] [address] since your last visit? Include any pap smears or colon screening.  No     Here today for BPP per [facility] cn

## 2017-07-14 ENCOUNTER — ANESTHESIA (OUTPATIENT)
Dept: LABOR AND DELIVERY | Age: 27
DRG: 560 | End: 2017-07-14
Payer: MEDICAID

## 2017-07-14 ENCOUNTER — HOSPITAL ENCOUNTER (INPATIENT)
Age: 27
LOS: 2 days | Discharge: HOME | DRG: 560 | End: 2017-07-16
Attending: OBSTETRICS & GYNECOLOGY | Admitting: ADVANCED PRACTICE MIDWIFE
Payer: MEDICAID

## 2017-07-14 ENCOUNTER — ANESTHESIA EVENT (OUTPATIENT)
Dept: LABOR AND DELIVERY | Age: 27
DRG: 560 | End: 2017-07-14
Payer: MEDICAID

## 2017-07-14 PROBLEM — O13.9 GESTATIONAL HTN: Status: ACTIVE | Noted: 2017-07-14

## 2017-07-14 LAB
ALBUMIN SERPL BCP-MCNC: 2.9 G/DL (ref 3.5–5)
ALBUMIN/GLOB SERPL: 1 {RATIO} (ref 1.1–2.2)
ALP SERPL-CCNC: 134 U/L (ref 45–117)
ALT SERPL-CCNC: 21 U/L (ref 12–78)
ANION GAP BLD CALC-SCNC: 11 MMOL/L (ref 5–15)
AST SERPL W P-5'-P-CCNC: 23 U/L (ref 15–37)
BASOPHILS # BLD AUTO: 0 K/UL (ref 0–0.1)
BASOPHILS # BLD: 0 % (ref 0–1)
BILIRUB SERPL-MCNC: 0.1 MG/DL (ref 0.2–1)
BUN SERPL-MCNC: 13 MG/DL (ref 6–20)
BUN/CREAT SERPL: 22 (ref 12–20)
CALCIUM SERPL-MCNC: 8.8 MG/DL (ref 8.5–10.1)
CHLORIDE SERPL-SCNC: 104 MMOL/L (ref 97–108)
CO2 SERPL-SCNC: 22 MMOL/L (ref 21–32)
CREAT SERPL-MCNC: 0.6 MG/DL (ref 0.55–1.02)
CREAT UR-MCNC: 207.95 MG/DL
DIFFERENTIAL METHOD BLD: ABNORMAL
EOSINOPHIL # BLD: 0.1 K/UL (ref 0–0.4)
EOSINOPHIL NFR BLD: 1 % (ref 0–7)
ERYTHROCYTE [DISTWIDTH] IN BLOOD BY AUTOMATED COUNT: 14.8 % (ref 11.5–14.5)
GLOBULIN SER CALC-MCNC: 3 G/DL (ref 2–4)
GLUCOSE SERPL-MCNC: 91 MG/DL (ref 65–100)
HCT VFR BLD AUTO: 32.2 % (ref 35–47)
HGB BLD-MCNC: 10.3 G/DL (ref 11.5–16)
LDH SERPL L TO P-CCNC: 172 U/L (ref 81–246)
LYMPHOCYTES # BLD AUTO: 27 % (ref 12–49)
LYMPHOCYTES # BLD: 1.8 K/UL (ref 0.8–3.5)
MCH RBC QN AUTO: 24.8 PG (ref 26–34)
MCHC RBC AUTO-ENTMCNC: 32 G/DL (ref 30–36.5)
MCV RBC AUTO: 77.6 FL (ref 80–99)
MONOCYTES # BLD: 0.8 K/UL (ref 0–1)
MONOCYTES NFR BLD AUTO: 12 % (ref 5–13)
NEUTS SEG # BLD: 3.8 K/UL (ref 1.8–8)
NEUTS SEG NFR BLD AUTO: 60 % (ref 32–75)
PLATELET # BLD AUTO: 149 K/UL (ref 150–400)
PLATELET COMMENTS,PCOM: ABNORMAL
POTASSIUM SERPL-SCNC: 4.2 MMOL/L (ref 3.5–5.1)
PROT SERPL-MCNC: 5.9 G/DL (ref 6.4–8.2)
PROT UR-MCNC: 107 MG/DL (ref 0–11.9)
PROT/CREAT UR-RTO: 0.5
RBC # BLD AUTO: 4.15 M/UL (ref 3.8–5.2)
RBC MORPH BLD: ABNORMAL
SODIUM SERPL-SCNC: 137 MMOL/L (ref 136–145)
URATE SERPL-MCNC: 4.8 MG/DL (ref 2.6–6)
WBC # BLD AUTO: 6.5 K/UL (ref 3.6–11)

## 2017-07-14 PROCEDURE — 3E033VJ INTRODUCTION OF OTHER HORMONE INTO PERIPHERAL VEIN, PERCUTANEOUS APPROACH: ICD-10-PCS | Performed by: ADVANCED PRACTICE MIDWIFE

## 2017-07-14 PROCEDURE — 74011250637 HC RX REV CODE- 250/637: Performed by: ADVANCED PRACTICE MIDWIFE

## 2017-07-14 PROCEDURE — 77030018749 HC HK AMNIO DISP DERY -A

## 2017-07-14 PROCEDURE — 84550 ASSAY OF BLOOD/URIC ACID: CPT | Performed by: ADVANCED PRACTICE MIDWIFE

## 2017-07-14 PROCEDURE — 74011000250 HC RX REV CODE- 250

## 2017-07-14 PROCEDURE — 74011250636 HC RX REV CODE- 250/636: Performed by: ANESTHESIOLOGY

## 2017-07-14 PROCEDURE — 74011250636 HC RX REV CODE- 250/636: Performed by: ADVANCED PRACTICE MIDWIFE

## 2017-07-14 PROCEDURE — 77010026065 HC OXYGEN MINIMUM MEDICAL AIR: Performed by: ADVANCED PRACTICE MIDWIFE

## 2017-07-14 PROCEDURE — 65270000029 HC RM PRIVATE

## 2017-07-14 PROCEDURE — 75410000000 HC DELIVERY VAGINAL/SINGLE: Performed by: ADVANCED PRACTICE MIDWIFE

## 2017-07-14 PROCEDURE — 77030005537 HC CATH URETH BARD -A

## 2017-07-14 PROCEDURE — 75410000002 HC LABOR FEE PER 1 HR: Performed by: ADVANCED PRACTICE MIDWIFE

## 2017-07-14 PROCEDURE — 77030014125 HC TY EPDRL BBMI -B: Performed by: ANESTHESIOLOGY

## 2017-07-14 PROCEDURE — 76060000078 HC EPIDURAL ANESTHESIA: Performed by: NURSE ANESTHETIST, CERTIFIED REGISTERED

## 2017-07-14 PROCEDURE — 83615 LACTATE (LD) (LDH) ENZYME: CPT | Performed by: ADVANCED PRACTICE MIDWIFE

## 2017-07-14 PROCEDURE — 85025 COMPLETE CBC W/AUTO DIFF WBC: CPT | Performed by: OBSTETRICS & GYNECOLOGY

## 2017-07-14 PROCEDURE — 84156 ASSAY OF PROTEIN URINE: CPT | Performed by: ADVANCED PRACTICE MIDWIFE

## 2017-07-14 PROCEDURE — 36415 COLL VENOUS BLD VENIPUNCTURE: CPT | Performed by: OBSTETRICS & GYNECOLOGY

## 2017-07-14 PROCEDURE — 75410000003 HC RECOV DEL/VAG/CSECN EA 0.5 HR: Performed by: ADVANCED PRACTICE MIDWIFE

## 2017-07-14 PROCEDURE — 10907ZC DRAINAGE OF AMNIOTIC FLUID, THERAPEUTIC FROM PRODUCTS OF CONCEPTION, VIA NATURAL OR ARTIFICIAL OPENING: ICD-10-PCS | Performed by: ADVANCED PRACTICE MIDWIFE

## 2017-07-14 PROCEDURE — 80053 COMPREHEN METABOLIC PANEL: CPT | Performed by: ADVANCED PRACTICE MIDWIFE

## 2017-07-14 RX ORDER — OXYTOCIN/RINGER'S LACTATE 20/1000 ML
125-500 PLASTIC BAG, INJECTION (ML) INTRAVENOUS ONCE
Status: COMPLETED | OUTPATIENT
Start: 2017-07-14 | End: 2017-07-14

## 2017-07-14 RX ORDER — LABETALOL 100 MG/1
100 TABLET, FILM COATED ORAL EVERY 12 HOURS
Status: DISCONTINUED | OUTPATIENT
Start: 2017-07-14 | End: 2017-07-16

## 2017-07-14 RX ORDER — SIMETHICONE 80 MG
80 TABLET,CHEWABLE ORAL
Status: DISCONTINUED | OUTPATIENT
Start: 2017-07-14 | End: 2017-07-16 | Stop reason: HOSPADM

## 2017-07-14 RX ORDER — IBUPROFEN 800 MG/1
800 TABLET ORAL EVERY 8 HOURS
Status: DISCONTINUED | OUTPATIENT
Start: 2017-07-14 | End: 2017-07-16 | Stop reason: HOSPADM

## 2017-07-14 RX ORDER — OXYTOCIN IN 5 % DEXTROSE 30/500 ML
1-25 PLASTIC BAG, INJECTION (ML) INTRAVENOUS
Status: DISCONTINUED | OUTPATIENT
Start: 2017-07-14 | End: 2017-07-14

## 2017-07-14 RX ORDER — LANOLIN ALCOHOL/MO/W.PET/CERES
1 CREAM (GRAM) TOPICAL
Status: DISCONTINUED | OUTPATIENT
Start: 2017-07-15 | End: 2017-07-16 | Stop reason: HOSPADM

## 2017-07-14 RX ORDER — FENTANYL/BUPIVACAINE/NS/PF 2-1250MCG
1-16 PREFILLED PUMP RESERVOIR EPIDURAL CONTINUOUS
Status: DISCONTINUED | OUTPATIENT
Start: 2017-07-14 | End: 2017-07-14

## 2017-07-14 RX ORDER — LIDOCAINE HYDROCHLORIDE 10 MG/ML
10 INJECTION INFILTRATION; PERINEURAL AS NEEDED
Status: DISCONTINUED | OUTPATIENT
Start: 2017-07-14 | End: 2017-07-14

## 2017-07-14 RX ORDER — HYDROCODONE BITARTRATE AND ACETAMINOPHEN 5; 325 MG/1; MG/1
1 TABLET ORAL
Status: DISCONTINUED | OUTPATIENT
Start: 2017-07-14 | End: 2017-07-16 | Stop reason: HOSPADM

## 2017-07-14 RX ORDER — SODIUM CHLORIDE 0.9 % (FLUSH) 0.9 %
5-10 SYRINGE (ML) INJECTION AS NEEDED
Status: DISCONTINUED | OUTPATIENT
Start: 2017-07-14 | End: 2017-07-14 | Stop reason: HOSPADM

## 2017-07-14 RX ORDER — BUPIVACAINE HYDROCHLORIDE 2.5 MG/ML
INJECTION, SOLUTION EPIDURAL; INFILTRATION; INTRACAUDAL AS NEEDED
Status: DISCONTINUED | OUTPATIENT
Start: 2017-07-14 | End: 2017-07-14 | Stop reason: HOSPADM

## 2017-07-14 RX ORDER — SODIUM CHLORIDE, SODIUM LACTATE, POTASSIUM CHLORIDE, CALCIUM CHLORIDE 600; 310; 30; 20 MG/100ML; MG/100ML; MG/100ML; MG/100ML
50 INJECTION, SOLUTION INTRAVENOUS CONTINUOUS
Status: DISCONTINUED | OUTPATIENT
Start: 2017-07-14 | End: 2017-07-14

## 2017-07-14 RX ORDER — ACETAMINOPHEN 325 MG/1
650 TABLET ORAL
Status: DISCONTINUED | OUTPATIENT
Start: 2017-07-14 | End: 2017-07-16 | Stop reason: HOSPADM

## 2017-07-14 RX ORDER — LABETALOL HYDROCHLORIDE 5 MG/ML
20 INJECTION, SOLUTION INTRAVENOUS
Status: DISCONTINUED | OUTPATIENT
Start: 2017-07-14 | End: 2017-07-14

## 2017-07-14 RX ORDER — NALOXONE HYDROCHLORIDE 0.4 MG/ML
0.4 INJECTION, SOLUTION INTRAMUSCULAR; INTRAVENOUS; SUBCUTANEOUS AS NEEDED
Status: DISCONTINUED | OUTPATIENT
Start: 2017-07-14 | End: 2017-07-16 | Stop reason: HOSPADM

## 2017-07-14 RX ORDER — SODIUM CHLORIDE 0.9 % (FLUSH) 0.9 %
5-10 SYRINGE (ML) INJECTION EVERY 8 HOURS
Status: DISCONTINUED | OUTPATIENT
Start: 2017-07-14 | End: 2017-07-14 | Stop reason: HOSPADM

## 2017-07-14 RX ORDER — NALOXONE HYDROCHLORIDE 0.4 MG/ML
0.4 INJECTION, SOLUTION INTRAMUSCULAR; INTRAVENOUS; SUBCUTANEOUS AS NEEDED
Status: DISCONTINUED | OUTPATIENT
Start: 2017-07-14 | End: 2017-07-14 | Stop reason: HOSPADM

## 2017-07-14 RX ORDER — MAG HYDROX/ALUMINUM HYD/SIMETH 200-200-20
30 SUSPENSION, ORAL (FINAL DOSE FORM) ORAL
Status: DISCONTINUED | OUTPATIENT
Start: 2017-07-14 | End: 2017-07-14

## 2017-07-14 RX ORDER — ACETAMINOPHEN 325 MG/1
650 TABLET ORAL
Status: DISCONTINUED | OUTPATIENT
Start: 2017-07-14 | End: 2017-07-14

## 2017-07-14 RX ADMIN — LABETALOL HCL 100 MG: 100 TABLET, FILM COATED ORAL at 20:34

## 2017-07-14 RX ADMIN — SODIUM CHLORIDE, SODIUM LACTATE, POTASSIUM CHLORIDE, AND CALCIUM CHLORIDE 50 ML/HR: 600; 310; 30; 20 INJECTION, SOLUTION INTRAVENOUS at 08:00

## 2017-07-14 RX ADMIN — Medication 2 MILLI-UNITS/MIN: at 08:09

## 2017-07-14 RX ADMIN — IBUPROFEN 800 MG: 800 TABLET, FILM COATED ORAL at 23:40

## 2017-07-14 RX ADMIN — SODIUM CHLORIDE, SODIUM LACTATE, POTASSIUM CHLORIDE, AND CALCIUM CHLORIDE 999 ML/HR: 600; 310; 30; 20 INJECTION, SOLUTION INTRAVENOUS at 16:46

## 2017-07-14 RX ADMIN — BUPIVACAINE HYDROCHLORIDE 10 ML: 2.5 INJECTION, SOLUTION EPIDURAL; INFILTRATION; INTRACAUDAL at 17:26

## 2017-07-14 RX ADMIN — FENTANYL 0.2 MG/100ML-BUPIV 0.125%-NACL 0.9% EPIDURAL INJ 10 ML/HR: 2/0.125 SOLUTION at 17:44

## 2017-07-14 RX ADMIN — Medication 2500 MILLI-UNITS/HR: at 19:19

## 2017-07-14 NOTE — ANESTHESIA PROCEDURE NOTES
Epidural Block    Start time: 7/14/2017 3:15 PM  End time: 7/14/2017 5:32 PM  Performed by: Lyn Roth  Authorized by: Lyn Roth     Pre-Procedure  Indication: at surgeon's request and labor epidural    Preanesthetic Checklist: patient identified, risks and benefits discussed, anesthesia consent, patient being monitored, timeout performed and anesthesia consent      Epidural:   Patient position:  Seated  Prep region:  Lumbar  Prep: Betadine    Location:  L2-3    Needle and Epidural Catheter:   Needle Type:  Tuohy  Needle Gauge:  17 G  Injection Technique:  Loss of resistance using air  Attempts:  1  Catheter Size:  18 G  Events: no paresthesia and negative aspiration test    Test Dose:  Lidocaine 1.5% w/ epi and negative    Assessment:   Catheter Secured:  Tape  Insertion:  Uncomplicated  Patient tolerance:  Patient tolerated the procedure well with no immediate complications

## 2017-07-14 NOTE — ROUTINE PROCESS
0700: Bedside, Verbal and Written shift change report given to ALIA Zhu RN (oncoming nurse) by Abram Herndon RN (offgoing nurse). Report included the following information SBAR, Kardex, Intake/Output, MAR and Accordion. 0720: KEVIN Hays at pt bedside at this time. KEVIN discussing plan of care with pt at this time, pt verbalizing understanding. 0730: SVE per CNM 3cm dilation, 50% effaced, soft cervix noted    0735: CNM K. [place] stating patient can hydrate herself with juice and water. KEVIN stating so long as patient's blood pressure remains at her baseline of 140's/80's or lower, she may cope with contractions out of bed on birthing ball and in tub.    0746: KEVIN Hays stating patient can eat small snacks during day for energy. 0815: KEVIN Hays at pt bedside at this time discussing plan of care, pt verbalizing understanding. 26: Spoke with KEVIN Hays over phone to update AZALEAM on patient condition and rising blood pressures. KEVIN stated she will consult with Dr. Angela Sandoval to determine plan and will then call me back to discuss. 4059: Called KEVIN Hays on phone to provide update on blood pressure readings and lab values. Waiting on CNM to call back at this time. 36: Spoke with KEVIN Hays on phone to provide pt update. KEVIN stating that she will head over to assess pt at this time and we will initiate labetalol protocol at that time. 0869: Speaking to KEVIN Hays at nurses station at this time. KEVIN stating to initiate beginning phase of labetalol protocol if patient's blood pressures rise to protocol parameters. KEVIN stating that if patient's blood pressure remains within these parameters following first dose of labetalol, to call her to receive following orders. 1000: JESSICA Hays at pt bedside at this time discussing plan of care with pt, pt verbalizing understanding. 1050: Called KEVIN Hays on phone to provide update on patient and recent lab values.  Waiting on CNM to call back at this time. 1112:  Called KEVIN Hays on phone to provide update on patient and recent lab values. Waiting on CNM to call back at this time. 1115: Spoke with KEVIN Hays on phone to provide pt update. CNM stating she will be over soon to assess patient and determine if AROM is necessary. 1159: KEVIN Hays at pt bedside at this time. SVE per CNM 3.5 cm, 50% effaced. Baby noted by CNM to be \"very high\"    1350: Called KEVIN Hays on phone to provide update on patient. Waiting on CNM to call back at this time. 1400: Spoke with KEVIN Hays on phone to provide pt update. CNM confirming that patient can remain on the birthing ball at this point given pt status. 1523: Spoke with KEVIN Hays on phone to provide pt update. CNM stating that she will be over in 15 minutes to assess pt.    1544: KEVIN Hays at pt bedside at this time    1547: SVE per CNM 4/90%/-3    1549: AROM per CNM at this time, small amount of clear fluid noted. 1555: CNM leaving pt bedside at this time and stating that she should be back to check on pt around 5    1610: Pt requesting epidural at this time    1700: KEVIN Hays at pt bedside at this time    1714: Dr. Stephani Hurtado at pt bedside at this time for placement of epidural    1748: SVE per CNM 8/100/0    1758: SVE per CNM 10/100/0. Patient educated on how to push and placed in lithotomy position at this time. Pt bearing down with each contraction. CNM remaining at pt perineum. 181: Infant head out, CNM assessing for nuchals, 1 loose nuchal present and reduced.  viable female infant, infant placed on maternal abdomen and dried and stimulated. 1900: Bedside, Verbal and Written shift change report given to Mamta Chowdhury RN (oncoming nurse) by ALIA Mclaughlin RN (offgoing nurse). Report included the following information SBAR, Kardex, Intake/Output, MAR, Accordion and Recent Results.

## 2017-07-14 NOTE — PROGRESS NOTES
CNM Labor Progress Note     Patient: Elvie Anand MRN: [id number]  SSN: [id number]    YOB: 1990  Age: 32 y.o. Sex: female        Subjective:   Patient coping well with contractions. Was uncomfortable and standing at side of bed \"dancing\" with contractions and wanted epidural, bolus given. Relief after epidural except for increased pressure. Objective:   Blood pressure 146/90, pulse 74, temperature 99.2 °F (37.3 °C), resp. rate 14, height 5' 4\" (1.626 m), weight 237 lb (107.5 kg), last menstrual period 10/10/2016, SpO2 100 %, currently breastfeeding toddler. Pitocin off. Fetal heart baseline 125, moderate variability, positive accelerations, positive early decelerations with occasional variables, Uterine contractions q 2 minutes,  strong to palpation, resting tone soft, Sterile Vaginal Exam: 8 cm dilated/ 90% effaced/ 0 station, cervix anterior, fetal presentation vertex, membranes ruptured clear. Assessment:     39w0d  Category 2 fetal heart rate tracing   Labor  Following IOL for chronic hypertension with superimposed pre-e.      Plan:   Continue current orders/management   CNM management with MD consult prn  Anticipate     Jennifer Rodriguez CNM

## 2017-07-14 NOTE — H&P
History & Physical    Name: Bandar Delgado MRN: [id number]  SSN: [id number]    YOB: 1990  Age: 32 y.o. Sex: female       Lucas Brush is a 31 YO G& at 39+0 weeks gestation presents to  for admission for IOL for chronic hypertension/gestational HTN. She was started on Labetalol during her third trimester for increasing blood pressures. Urine protein has always remained under 300 mm. She does have a history of pre-e and was induced with all of them, one at 35 weeks gestation due to pre-e. She reports good fetal movement today and reports occasional contractions. She denies headaches, epigastric pain or visual changes. She was counseled on the risk of induction and is agreeable to continuing with the plan of care with pitocin since her cervix is favorable. Estimated Date of Delivery: 17  OB History    Para Term  AB Living   7 3 2 1 3 3   SAB TAB Ectopic Molar Multiple Live Births   3 0 0  0 3      # Outcome Date GA Lbr Max/2nd Weight Sex Delivery Anes PTL Lv   7 Current            6 Term 14 37w3d 03:13 / 00:10 6 lb 1.4 oz (2.76 kg) M VAGINAL DELI None N MALOU   5 SAB 13           4 Term 09/22/10 38w0d   F VAGINAL DELI EPI  MALOU   3 SAB 09           2  08 35w0d   M VAGINAL DELI EPI  MALOU      Complications: Preeclampsia   1 SAB                   Ms. Suzanna Strauss is admitted with pregnancy at 39w0d for induction of labor due to hypertension. Prenatal course was complicated by chronic hypertension. Please see prenatal records for details. Past Medical History:   Diagnosis Date    Abnormal Pap smear     abdnormal at 1st PNV will f/u after preg.  Anemia     Essential hypertension     CHTN    Hyperhidrosis     Hypertension     Preeclampsia      History reviewed. No pertinent surgical history. Social History     Occupational History    Not on file.      Social History Main Topics    Smoking status: Never Smoker    Smokeless tobacco: Never Used   [address] [place] [address] Darrell Alcohol use No    Drug use: No    Sexual activity: Yes     Partners: Male     Birth control/ protection: None     Family History   Problem Relation Age of Onset    Diabetes Mother     Breast Cancer Maternal Aunt     Diabetes Maternal Grandmother     Heart Disease Mother     Cancer Mother        No Known Allergies  Prior to Admission medications    Medication Sig Start Date End Date Taking? Authorizing Provider   labetalol (NORMODYNE) 100 mg tablet Take 1 Tab by mouth two (2) times a day. 7/5/17  Yes Mony Hays CNM   cholecalciferol, vitamin D3, (VITAMIN D3) 2,000 unit tab Take 4,000 Int'l Units by mouth daily. Yes Historical Provider   aspirin 81 mg chewable tablet Take 81 mg by mouth daily. Yes Historical Provider   PNV no.24-iron-folic acid-dha (PRENATAL DHA+COMPLETE PRENATAL) -300 mg-mcg-mg cmpk Take 30 mg/day by mouth daily. Indications: PREGNANCY   Yes Historical Provider        Review of Systems: Constitutional: negative  Eyes: negative  Ears, Nose, Mouth, Throat, and Face: negative  Respiratory: negative  Cardiovascular: negative  Gastrointestinal: negative  Genitourinary:negative  Integument/Breast: negative  Musculoskeletal:negative  Neurological: negative  Behavioral/Psychiatric: negative    Objective:     Vitals:  Vitals:    07/14/17 0734 07/14/17 0739 07/14/17 0740 07/14/17 0744   BP:   132/84    Pulse:   78    Resp:       Temp:       SpO2: 98% 97%  98%   Weight:       Height:            Physical Exam:  Patient without distress. Breast: galactorrhea due to still breast feeding 3year old toddler.   Heart: Regular rate and rhythm, S1S2 present or without murmur or extra heart sounds  Lung: clear to auscultation throughout lung fields, no wheezes, no rales, no rhonchi and normal respiratory effort  Back: costovertebral angle tenderness absent  Abdomen: soft, nontender, soft  Fundus: soft, non tender and efw 6.5 lbs  Perineum: blood absent, amniotic fluid absent  Cervical Exam: 3 cm dilated    50% effaced    -3 station    Presenting Part: cephalic  Cervical Position: mid position  Consistency: Soft  Lower Extremities:  - Edema No  Membranes:  Intact  Fetal Heart Rate: Reactive  Baseline: 13- per minute  Variability: moderate  Accelerations: yes  Decelerations: none  Uterine contractions: irregular, every 10-12 minutes          Prenatal Labs:   Lab Results   Component Value Date/Time    Rubella, External Immune 01/16/2017    HBsAg, External Negative 01/16/2017    HIV, External Negative 01/16/2017    RPR, External Non-reacvtive 02/25/2014    Gonorrhea, External Negative 12/19/2016    Chlamydia, External Negative 12/19/2016    ABO,Rh A positive 02/25/2014    GrBStrep, External NEGATIVE 06/23/2017       Impression/Plan:      Active Problems:         Gestational HTN (7/14/2017)         Plan: Admit for induction of labor. Group B Strep negative. Pitocin ordered. Will consider AROM when more active. May eat solids while in latent labor and desires.      Signed By:  Erin Oneal CNM     July 14, 2017

## 2017-07-14 NOTE — ANESTHESIA PREPROCEDURE EVALUATION
Anesthetic History   No history of anesthetic complications            Review of Systems / Medical History  Patient summary reviewed and pertinent labs reviewed    Pulmonary  Within defined limits                 Neuro/Psych   Within defined limits           Cardiovascular    Hypertension              Exercise tolerance: >4 METS     GI/Hepatic/Renal  Within defined limits              Endo/Other  Within defined limits           Other Findings              Physical Exam    Airway    TM Distance: 4 - 6 cm  Neck ROM: normal range of motion   Mouth opening: Normal     Cardiovascular    Rhythm: regular  Rate: normal         Dental         Pulmonary                 Abdominal         Other Findings            Anesthetic Plan    ASA: 2  Anesthesia type: epidural            Anesthetic plan and risks discussed with: Patient

## 2017-07-14 NOTE — PROGRESS NOTES
[facility] Labor Progress Note     Patient: Luis Dalal MRN: [id number]  SSN: [id number]    YOB: 1990  Age: 32 y.o. Sex: female        Subjective:   Patient coping well with contractions. Starting to feel some contractions, pain 1-2/10. Objective:   Blood pressure 150/88, pulse 61, temperature 98.8 °F (37.1 °C), resp. rate 14, height 5' 4\" (1.626 m), weight 237 lb (107.5 kg), last menstrual period 10/10/2016, SpO2 98 %, currently breastfeeding. pitocin at 6 mu/min. Has had a couple of BPs >495 diastolic. Consulted with Dr. Jaret Johnson and order written for Labetalol 20 mg IV as per protocol. P:CR is 0.5.  plts remain stable. Fetal heart baseline 130s  moderate variability, positive accelerations, negative decelerations, Uterine contractions q3-5 minutes, mild to palpation, resting tone soft, Sterile Vaginal Exam: deferred fetal presentation vertex, membranes intact        Assessment:     39w0d  Category 1 fetal heart rate tracing   IOL for pre-e      Plan:   Continue current orders/management   IV Labetalol PRN for BP >160  CNM management  Consult with Dr. Jaret Johnson done for bp elevations.   Anticipate     Jolie Lovett CNM

## 2017-07-14 NOTE — IP AVS SNAPSHOT
Jackeline Schultz 
 
 
 [address] [facility] 
[phone] Patient: Faith Cates 
MRN: [id number] You are allergic to the following No active allergies Recent Documentation Height Weight Breastfeeding? BMI OB Status Smoking Status 1.626 m 107.5 kg Unknown 40.68 kg/m2 Recent pregnancy Never Smoker Emergency Contacts Name Discharge Info Relation Home Work Mobile VilmaAbrahan DISCHARGE CAREGIVER [3] Boyfriend [17] [phone] Abrahan Esparza  Other Relative [6] [phone] About your hospitalization You were admitted on:  July 14, 2017 You last received care in the:  [facility] You were discharged on:  July 16, 2017 Unit phone number:  [phone] Why you were hospitalized Your primary diagnosis was:  Not on File Your diagnoses also included:  Labor Without Complication, Gestational Htn Providers Seen During Your Hospitalizations Provider Role Specialty Primary office phone Erin Oneal CNM Attending Provider Certified Nurse Midwife [phone] Gemini Pimentel MD Attending Provider Obstetrics & Gynecology [phone] Your Primary Care Physician (PCP) Primary Care Physician Office Phone Office Fax [place] [phone] Follow-up Information Follow up With Details Comments Contact Info Pat[facility] Puente MD   [phone] 
[phone] Yovani William CNM In 3 days BP Check [address] [address] [facility] 
[phone] Current Discharge Medication List  
  
CONTINUE these medications which have CHANGED Dose & Instructions Dispensing Information Comments Morning Noon Evening Bedtime  
 labetalol 200 mg tablet Commonly known as:  Rafaela Henriquez What changed:   
- medication strength 
- how much to take Your last dose was: Your next dose is:    
   
   
 Dose:  200 mg Take 1 Tab by mouth two (2) times a day. Indications: hypertension Quantity:  60 Tab Refills:  1 CONTINUE these medications which have NOT CHANGED Dose & Instructions Dispensing Information Comments Morning Noon Evening Bedtime PRENATAL DHA+COMPLETE PRENATAL -300 mg-mcg-mg Cmpk Generic drug:  [id number]-PRXA ángel-folic-dha Your last dose was: Your next dose is:    
   
   
 Dose:  30 mg/day Take 30 mg/day by mouth daily. Indications: PREGNANCY Refills:  0  
     
   
   
   
  
 VITAMIN D3 2,000 unit Tab Generic drug:  cholecalciferol (vitamin D3) Your last dose was: Your next dose is:    
   
   
 Dose:  4000 Int'l Units Take 4,000 Int'l Units by mouth daily. Refills:  0 STOP taking these medications   
 aspirin 81 mg chewable tablet Where to Get Your Medications Information on where to get these meds will be given to you by the nurse or doctor. ! Ask your nurse or doctor about these medications  
  labetalol 200 mg tablet Discharge Instructions Congratulations on the birth of your baby! Here are some instructions which you may find helpful during the transition home and first few weeks postpartum. Feel free to call our office ([phone]) anytime to speak with a midwife if you have questions or need resources. Follow-up Please call our office as soon as possible to make your 3 to 5 day BP check appt as directed Call immediately or come to hospital with any s/s of Pre Eclampsia. Sudden headache, visual disturbance, right upper quadrant pain or sudden n/v.  
 
Medication 
- continue Ibuprofen OTC (over the counter) up to 800 mg every 6 hours as needed for pain or cramping Prenatal vitamin and supplements - continue while breastfeeding the same as taken during pregnancy (such as vitamin D3, calcium, iron, B complex, etc) Red raspberry leaf tea 
 - may continue drinking for the uterine toning effects as the uterus is returning to its pre-pregnancy state; *this may decrease cramping severity* Evening primrose oil  
 - may be continued at a dose of 500 mg daily as it may aid in mild breast discomfort while breastfeeding Breastfeeding helps - Mother's Milk Tea, fenugreek (for increasing milk supply) - Happy Ducts (for clogged ducts) *available for purchase online from [employer] or at [address] in [employer] (for resources and tips from lactation consultants) 
 - see more instructions to follow Constipation  
 - increase fluids, herbal tea, fiber, vegetable juices, and prune juice  
 - glycerine suppositories may be used per package instructions - Smooth Move tea is often helpful   
 - docusate sodium 100 mg twice daily may be used, if desired (may continue until you feel comfortable with bowel movements) Postpartum support groups - Birth Talk Birth [place] Support [place] for families interested in having a natural, unmedicated hospital birth First Monday of every month at 6:30pm 
[facility] [place] [address] [address] 
[phone] 
[email or web address] 
 
 - Mothering [place] First Tuesday of every month from 7:00 to 8:30pm 
[facility] [place] [address] [email or web address] [email or web address] 
 
 - New Mothers Support Group For mothers of infants up to 16 weeks old Wednesdays, 12:00 noon-1:30 p.m. [address] [place] [address] to pre-register and for location information [email or web address] or [phone] 
 
 - Postpartum Depression [place] Third Friday of every month from 10:00-11:00am 
[email or web address] (email for location) - Postpartum Counseling  PPD, Birth Trauma [facility] Counseling [email or web address] Breast-Feeding: Your Care Instructions Breast-feeding has many benefits. It may lower your baby's chances of getting an infection. It also may prevent your baby from having problems such as diabetes and high cholesterol later in life. Breast-feeding also helps you bond with your baby. The American Academy of Pediatrics recommends breast-feeding for at least a year. That may be very hard for many women to do, but breast-feeding even for a shorter period of time is a health benefit to you and your baby. In the first days after birth, your breasts make a thick, yellow liquid called colostrum. This liquid gives your baby nutrients and antibodies against infection. It is all that babies need in the first days after birth. Your breasts will fill with milk a few days after the birth. Breast-feeding is a skill that gets better with practice. It is common to have some problems. Some women have sore or cracked nipples, blocked milk ducts, or a breast infection (mastitis). But if you feed your baby every 1 to 2 hours during the day and follow the tips on this sheet, you may not have these problems. You can treat these problems if they happen and continue breast-feeding. Follow-up care is a key part of your treatment and safety. Be sure to make and go to all appointments, and call your doctor if you are having problems. It's also a good idea to know your test results and keep a list of the medicines you take. How can you care for yourself at home? Breast-feed your baby whenever he or she is hungry. In the first 2 weeks, your baby will feed about every 1 to 3 hours. This will help you keep up your supply of milk. Put a bed pillow or a nursing pillow on your lap to support your arms and your baby. Hold your baby in a comfortable position. You can hold your baby in several ways. One of the most common positions is the cradle hold. One arm supports your baby, with his or her head in the bend of your elbow. Your open hand supports your baby's bottom or back. Your baby's belly lies against yours. If you had your baby by , or , try the football hold. This position keeps your baby off your belly. Tuck your baby under your arm, with his or her body along the side you will be feeding on. Support your baby's upper body with your arm. With that hand you can control your baby's head to bring his or her mouth to your breast.  
Try different positions with each feeding. If you are having problems, ask for help from your doctor or a lactation consultant. To get your baby to latch on:  
Support and narrow your breast with one hand using a \"U hold,\" with your thumb on the outer side of your breast and your fingers on the inner side. You can also use a \"C hold,\" with all your fingers below the nipple and your thumb above it. Try the different holds to get the deepest latch for whichever breast-feeding position you use. Your other arm is behind your baby's back, with your hand supporting the base of the baby's head. Position your fingers and thumb to point toward your baby's ears. You can touch your baby's lower lip with your nipple to get your baby to open his or her mouth. Wait until your baby opens up really wide, like a big yawn. Then be sure to bring the baby quickly to your breastnot your breast to the baby. As you bring your baby toward your breast, use your other hand to support the breast and guide it into his or her mouth. Both the nipple and a large portion of the darker area around the nipple (areola) should be in the baby's mouth. The baby's lips should be flared outward, not folded in (inverted).   
Listen for a regular sucking and swallowing pattern while the baby is feeding. If you cannot see or hear a swallowing pattern, watch the baby's ears, which will wiggle slightly when the baby swallows. If the baby's nose appears to be blocked by your breast, tilt the baby's head back slightly, so just the edge of one nostril is clear for breathing. When your baby is latched, you can usually remove your hand from supporting your breast and bring it under your baby to cradle him or her. Now just relax and breast-feed your baby. You will know that your baby is feeding well when:  
His or her mouth covers a lot of the areola, and the lips are flared out. His or her chin and nose rest against your breast.  
Sucking is deep and rhythmic, with short pauses. You are able to see and hear your baby swallowing. You do not feel pain in your nipple. If your baby takes only one breast at a feeding, start the next feeding on the other breast.  
Anytime you need to remove your baby from the breast, put one finger in the corner of his or her mouth. Push your finger between your baby's gums to gently break the seal. If you do not break the tight seal before you remove your baby, your nipples can become sore, cracked, or bruised. After feeding your baby, gently pat his or her back to let out any swallowed air. After your baby burps, offer the breast again, or offer the other breast. Sometimes a baby will want to keep feeding after being burped. When should you call for help? Call your doctor now or seek immediate medical care if:  
You have symptoms of a breast infection, such as: Increased pain, swelling, redness, or warmth around a breast.  
Red streaks extending from the breast.  
Pus draining from a breast.  
A fever. Your baby has no wet diapers for 6 hours. Watch closely for changes in your health, and be sure to contact your doctor if:  
Your baby has trouble latching on to your breast.  
You continue to have pain or discomfort when breast-feeding. You have other questions or concerns. After Pregnancy: Exercises Your Care Instructions Here are some examples of exercises that can help after pregnancy. Start each exercise slowly. Ease off the exercise if you start to have pain. Your doctor or physical therapist will tell you when you can start these exercises and which ones will work best for you. How to do the exercises Tummy tuck 1. Lie on your back, and place two fingers just inside your hip bones so you can feel your lower belly muscles. 2. Take a deep breath in.  
3. As you breathe out, pull your belly button in toward your spine, as if you are trying to zip up a tight pair of jeans. You should feel your lower belly muscles pull slightly away from your fingers as the muscles tighten. 4. Hold for about 6 seconds, but do not hold your breath. 5. Repeat 8 to 12 times. 6. Repeat several times a day, and try to hold your lower belly muscles in for longer as you get stronger. 7. Practice doing this exercise while you are standing, such as when you are standing in line, or sitting. Heel slides 1. Lie on the floor with your knees bent, and place two fingers just inside your hip bones so you can feel your lower belly muscles. Your feet should be flat on the floor. 2. Pull your belly button in toward your spine. You should feel your lower belly muscles pull slightly away from your fingers as the muscles tighten. 3. Keep holding your belly button in as you slowly slide one foot along the floor until your leg is out straight. 4. Slowly slide the leg back to your starting position while making sure that you keep holding your belly button in.  
5. Do not arch or move your back as you are doing this. Do not hold your breath. 6. Relax and repeat with your other leg. 7. Repeat 8 to 12 times. Knee-to-chest stretch 1. Lie on your back with one knee bent and the other leg straight. 2. Clasp your hands together under your bent knee and bring the knee to your chest. Keep your lower back pressed to the floor. 3. If it hurts your back to keep your opposite leg straight as you stretch, bend that knee too, and keep that foot flat on the floor. 4. Hold for at least 15 to 30 seconds. 5. Relax and lower the knee to the starting position. 6. Repeat with the other leg. 7. Repeat 2 to 4 times with each leg. Neck rotation 1. Sit in a firm chair, or stand up straight. 2. Keeping your chin level, turn your head to the right, and hold for 15 to 30 seconds. 3. Turn your head to the left and hold for 15 to 30 seconds. 4. Repeat 2 to 4 times to each side. Shoulder rolls 1. Sit comfortably with your feet shoulder-width apart. You can also do this exercise while standing. 2. Roll your shoulders up, then back, and then down in a smooth, circular motion. 3. Repeat 2 to 4 times. Midback stretch Note: If you have knee pain, do not do this exercise. 1. Kneel on the floor, and sit back on your ankles. 2. Lean forward, place your hands on the floor, and stretch your arms out in front of you. Rest your head between your arms. 3. Gently push your chest toward the floor, reaching as far in front of you as possible. 4. Hold for 15 to 30 seconds. 5. Repeat 2 to 4 times. Back stretches 1. Get down on your hands and knees on the floor. 2. Relax your head and allow it to droop. Round your back up toward the ceiling until you feel a nice stretch in your upper, middle, and lower back. Hold this stretch for as long as it feels comfortable, or about 15 to 30 seconds. 3. Return to the starting position with a flat back while you are on your hands and knees. 4. Let your back sway by pressing your stomach toward the floor. Lift your buttocks toward the ceiling. 5. Hold this position for 15 to 30 seconds. 6. Repeat 2 to 4 times. Hamstring stretch (lying down) 1. Lie flat on your back with your legs straight. If you feel discomfort in your back, place a small towel roll under your lower back. 2. Holding the back of your leg, lift your leg straight up and toward your body until you feel a stretch at the back of your thigh. 3. Hold the stretch for at least 30 seconds. 4. Repeat 2 to 4 times. 5. Switch legs and repeat steps 1 through 4. Calf stretch 1. Stand facing a wall with your hands on the wall at about eye level. Put your leg about a step behind your other leg. 2. Keeping your back leg straight and your back heel on the floor, bend your front knee and gently bring your hip and chest toward the wall until you feel a stretch in the calf of your back leg. 3. Hold the stretch for 15 to 30 seconds. 4. Repeat 2 to 4 times. 5. Repeat steps 1 through 4, but this time keep your back knee bent. 6. Switch legs and repeat steps 1 through 5. Follow-up care is a key part of your treatment and safety. Be sure to make and go to all appointments, and call your doctor if you are having problems. It's also a good idea to know your test results and keep a list of the medicines you take. After Your Delivery (the Postpartum Period): After Your Visit Your Care Instructions Congratulations on the birth of your baby. Like pregnancy, the  period can be a time of excitement, emmie, and exhaustion. You may look at your wondrous little baby and feel happy. You may also be overwhelmed by your new sleep hours and new responsibilities. At first, babies often sleep during the days and are awake at night. They do not have a pattern or routine. They may make sudden gasps, jerk themselves awake, or look like they have crossed eyes. These are all normal, and they may even make you smile. In these first weeks after delivery, try to take good care of yourself.  It may take 4 to 6 weeks to feel like yourself again, and possibly longer if you had a  birth. You will likely feel very tired for several weeks. Your days will be full of ups and downs, but lots of emmie as well. Follow-up care is a key part of your treatment and safety. Be sure to make and go to all appointments, and call your doctor if you are having problems. It's also a good idea to know your test results and keep a list of the medicines you take. How can you care for yourself at home? Take care of your body after delivery Use pads instead of tampons for the bloody flow that may last as long as 2 weeks. Ease cramps with ibuprofen (Advil, Motrin). Ease soreness of hemorrhoids and the area between your vagina and rectum with ice compresses or witch hazel pads. Ease constipation by drinking lots of fluid and eating high-fiber foods. Ask your doctor about over-the-counter stool softeners. Cleanse yourself with a gentle squeeze of warm water from a bottle instead of wiping with toilet paper. Take a sitz bath in warm water several times a day. Wear a good nursing bra. Ease sore and swollen breasts with warm, wet washcloths. If you are not breast-feeding, use ice rather than heat for breast soreness. Your period may not start for several months if you are breast-feeding. You may bleed more, and longer at first, than you did before you got pregnant. Wait until you are healed (about 4 to 6 weeks) before you have sexual intercourse. Your doctor will tell you when it is okay to have sex. Try not to travel with your baby for 5 or 6 weeks. If you take a long car trip, make frequent stops to walk around and stretch. Avoid exhaustion Rest every day. Try to nap when your baby naps. Ask another adult to be with you for a few days after delivery. Plan for  if you have other children. Stay flexible so you can eat at odd hours and sleep when you need to. Both you and your baby are making new schedules. Plan small trips to get out of the house. Change can make you feel less tired. Ask for help with housework, cooking, and shopping. Remind yourself that your job is to care for your baby. Know about help for postpartum depression \"Baby blues\" are common for the first 1 to 2 weeks after birth. You may cry or feel sad or irritable for no reason. Rest whenever you can. Being tired makes it harder to handle your emotions. Go for walks with your baby. Talk to your partner, friends, and family about your feelings. If your symptoms last for more than a few weeks, or if you feel very depressed, ask your doctor for help. Postpartum depression can be treated. Support groups and counseling can help. Sometimes medicine can also help. Stay healthy Eat healthy foods so you have more energy, make good breast milk, and lose extra baby pounds. If you breast-feed, avoid alcohol and drugs. Stay smoke-free. If you quit during pregnancy, congratulations. Start daily exercise after 4 to 6 weeks, but rest when you feel tired. Learn exercises to tone your belly. Do Kegel exercises to regain strength in your pelvic muscles. You can do these exercises while you stand or sit. Squeeze the same muscles you would use to stop your urine. Your belly and rear end (buttocks) should not move. Hold the squeeze for 3 seconds, then relax for 3 seconds. Repeat the exercise 10 to 15 times for each session. Do three or more sessions each day. Find a class for new mothers and new babies that has an exercise time. If you had a  birth, give yourself a bit more time before you exercise, and be careful. When should you call for help? Call 911 anytime you think you may need emergency care. For example, call if: You passed out (lost consciousness). Call your doctor now or seek immediate medical care if:  
You have severe vaginal bleeding.  This means you are passing blood clots and soaking through a pad each hour for 2 or more hours. You are dizzy or lightheaded, or you feel like you may faint. You have a fever. You have new belly pain, or your pain gets worse. Watch closely for changes in your health, and be sure to contact your doctor if:  
Your vaginal bleeding seems to be getting heavier. You have new or worse vaginal discharge. You feel sad, anxious, or hopeless for more than a few days. You do not get better as expected. Where can you learn more? Go to [employer] Enter A461 in the search box to learn more about \"After Your Delivery (the Postpartum Period): After Your Visit. \"   
© 1981-9904 [employer]wise[employer]. Care instructions adapted under license by Dylan Rodney (which disclaims liability or warranty for this information). This care instruction is for use with your licensed healthcare professional. If you have questions about a medical condition or this instruction, always ask your healthcare professional. [place] [address] any warranty or liability for your use of this information. Content Version: 2.4.801175; Last Revised: March 20, 2012 I Discharge Instructions Attachments/References PREECLAMPSIA (ENGLISH) Discharge Orders None Introducing [facility]! Dylan Rodney introduces [employer] patient portal. Now you can access parts of your medical record, email your doctor's office, and request medication refills online. 1. In your internet browser, go to [email or web address] 2. Click on the First Time User? Click Here link in the Sign In box. You will see the New Member Sign Up page. 3. Enter your [employer] Access Code exactly as it appears below. You will not need to use this code after youve completed the sign-up process. If you do not sign up before the expiration date, you must request a new code.  
 
· [employer] Access Code: [email or web address] 
 Expires: 9/17/2017 11:47 AM 
 
4. Enter the last four digits of your Social Security Number (xxxx) and Date of Birth (mm/dd/yyyy) as indicated and click Submit. You will be taken to the next sign-up page. 5. Create a [employer] ID. This will be your [employer] login ID and cannot be changed, so think of one that is secure and easy to remember. 6. Create a [employer] password. You can change your password at any time. 7. Enter your Password Reset Question and Answer. This can be used at a later time if you forget your password. 8. Enter your e-mail address. You will receive e-mail notification when new information is available in [address] 9. Click Sign Up. You can now view and download portions of your medical record. 10. Click the Download Summary menu link to download a portable copy of your medical information. If you have questions, please visit the Frequently Asked Questions section of the [employer] website. Remember, [employer] is NOT to be used for urgent needs. For medical emergencies, dial 911. Now available from your iPhone and Android! General Information Please provide this summary of care documentation to your next provider. Patient Signature:  ____________________________________________________________ Date:  ____________________________________________________________  
  
Vikash Luo Provider Signature:  ____________________________________________________________ Date:  ____________________________________________________________ More Information Preeclampsia: Care Instructions Your Care Instructions Preeclampsia occurs when a woman's blood pressure rises during pregnancy. Often with preeclampsia, you also have swelling in your legs, hands, and face. A test may show too much protein in your urine. Preeclampsia is also called toxemia.  If preeclampsia is severe and not treated, it can lead to seizures (eclampsia) and damage to your liver or kidneys. Preeclampsia can prevent your baby from getting enough food and oxygen. This can cause a low birth weight or other problems. Your doctor will watch you closely to prevent these problems. He or she also may recommend that you rest in bed most of the day. If your preeclampsia is a danger to your health or the health of your baby, your doctor may need to deliver your baby early. While preeclampsia is a concern, most women with preeclampsia have healthy babies. After a woman gives birth, preeclampsia usually goes away on its own. Follow-up care is a key part of your treatment and safety. Be sure to make and go to all appointments, and call your doctor if you are having problems. It's also a good idea to know your test results and keep a list of the medicines you take. How can you care for yourself at home? · Take and record your blood pressure at home if your doctor tells you to. ¨ Learn the importance of the two measures of blood pressure (such as 120 over 80, or 120/80). The first number is the systolic pressure, which is the force of blood on the artery walls as the heart pumps. The second number is the diastolic pressure, which is the force of blood on the artery walls between heartbeats, when the heart is at rest. You have a choice of monitors to use. ¨ Manual monitor: You pump up the cuff and use a stethoscope to listen for your pulse. ¨ Electronic monitor: The cuff inflates, and a gauge shows your pulse rate. ¨ To take your blood pressure: ¨ Ask your doctor to check your blood pressure monitor to be sure that it is accurate and that the cuff fits you. Also ask your doctor to watch you to make sure that you are using it right. ¨ You should not eat, use tobacco products, or use medicine known to raise blood pressure (such as some nasal decongestant sprays) before you take your blood pressure. ¨ Avoid taking your blood pressure if you have just exercised or are nervous or upset. Rest at least 15 minutes before you take your blood pressure. · If your doctor advises, check the protein levels in your urine. Your doctor or nurse will show you how to do this. · Take your medicines exactly as prescribed. Call your doctor if you think you are having a problem with your medicine. · Do not smoke. Quitting smoking will help lower your blood pressure and improve your baby's growth and health. If you need help quitting, talk to your doctor about stop-smoking programs and medicines. These can increase your chances of quitting for good. · Eat a balanced and healthy diet that has lots of fruits and vegetables. · If your doctor advised bed rest, be sure to stay off your feet and rest as much as possible. ¨ Keep a phone, phone book, notepad, and pen near the bed where you can easily reach them. ¨ Gently stretch your legs every hour to maintain good blood flow. ¨ Have another family member pack snacks and lunch food in a cooler close to your bed. ¨ Use this time for activities that you usually cannot find time for, such as reading, craft projects, or letter writing. · You can keep track of your baby's health by noting the length of time it takes to count 10 movements (such as kicks, flutters, or rolls). Feeling 10 movements in less than 1 hour is considered normal. Track your baby's movements once each day, and bring this record with you to each prenatal visit. When should you call for help? Call 911 anytime you think you may need emergency care. For example, call if: 
· You have a seizure. · You passed out (lost consciousness). · You have severe vaginal bleeding. · You have severe pain in your belly or pelvis. · You have had fluid gushing or leaking from your vagina and you know or think the umbilical cord is bulging into your vagina.  If this happens, immediately get down on your knees so your rear end (buttocks) is higher than your head. This will decrease the pressure on the cord until help arrives. Call your doctor now or seek immediate medical care if: 
· You have sudden swelling of your face, hands, or feet. · You have new vision problems (such as dimness or blurring). · You have a severe headache. · You have any vaginal bleeding. · You have belly pain or cramping. · You have a fever. · You have had regular contractions (with or without pain) for an hour. This means that you have 8 or more within 1 hour or 4 or more in 20 minutes after you change your position and drink fluids. · You have a sudden release of fluid from the vagina. · You have low back pain or pelvic pressure that does not go away. · You notice that your baby has stopped moving or is moving much less than normal. 
Watch closely for changes in your health, and be sure to contact your doctor if you have any questions. Where can you learn more? Go to http://heidy-maria de jesus.info/. Enter M944 in the search box to learn more about \"Preeclampsia: Care Instructions. \" Current as of: March 16, 2017 Content Version: 11.3 © 7404-9871 [employer]. Care instructions adapted under license by [employer] (which disclaims liability or warranty for this information). If you have questions about a medical condition or this instruction, always ask your healthcare professional. [place] [address] any warranty or liability for your use of this information.

## 2017-07-14 NOTE — PROGRESS NOTES
[facility] Labor Progress Note     Patient: Jeanette Ventura MRN: [id number]  SSN: [id number]    YOB: 1990  Age: 32 y.o. Sex: female        Subjective:   Patient coping well with contractions. Not feeling them much, feeling pressure anterior with urge to void but sometimes unable to go. A little frustrated with slow progress compared with last IOL. Objective:   Blood pressure 157/85, pulse 68, temperature 98.9 °F (37.2 °C), resp. rate 14, height 5' 4\" (1.626 m), weight 237 lb (107.5 kg), last menstrual period 10/10/2016, SpO2 99 %, currently breastfeeding. Mild range BPs, has not needed IV Labetalol. Pitocin at 14 mu/min. Fetal heart baseline 130s, moderate variability, positive accelerations, early deceleration after AROM, Uterine contractions q 1-3 minutes, mild palpation, resting tone soft, Sterile Vaginal Exam:4 cm dilated/ 90 % effaced/ -3station, cervix  midline, fetal presentation vertex, membranes ruptured for clear fluid after [place] Kylah was counseled on risk/benefit of AROM including risk of infection and risk of prolapsed cord and benefit of potential progression of IOL and agreed to procedure. Assessment:     39w0d  Category 1 fetal heart rate tracing   IOL for GHTN, now Pre-E.        Plan:   Continue current orders/management   Consult with Dr. Melyssa Bains CNM

## 2017-07-14 NOTE — PROGRESS NOTES
1855: bedside SBAR report received from LAW Nolan RN, assumed care of patient at this time. 1900: fundal check at this time, bleeding moderate, 20/1000 pitocin/LR initiated. 2045: patient up and ambulating to restroom with RN assistance, voided 150ML at this time, Ambulation well tolerated, epidural catheter removed at this time. 2130: bedside SBAR report given to Lisandro Rendon RN, transfer of patient care complete at this time.

## 2017-07-14 NOTE — L&D DELIVERY NOTE
CNM Delivery Note       Patient: Fei Carmen MRN: [id number]  SSN: [id number]    YOB: 1990  Age: 32 y.o. Sex: female        Complete cervical dilation at 1758.  of a live Baby Girl, \"James \" at 1830 with Apgars 8,9 in SAAD position under epidural anesthesia with mother in low bruno's position. Shoulders spontaneous, easily delivered with maternal effort after tight nuchal cord reduced. Vigorous infant placed on maternal abdomen immediately following delivery. Cord clamped x 2 and cut by dad after pulsation ceased. Placenta and membranes spontaneous, intact, with 3 vessel cord via [place] mechanism at 1850. Fundus massaged to firm. Estimated blood loss 150 mL. Vagina and perineum inspected. Mother and infant stable, bonding, establishing breastfeeding. Delivery Summary    Patient: Fei Carmen MRN: [id number]  SSN: [id number]    YOB: 1990  Age: 32 y.o.   Sex: female        Labor Events:    Labor: No    Rupture Date: 2017    Rupture Time: 3:49 PM    Rupture Type AROM    Amniotic Fluid Volume:      Amniotic Fluid Description: Clear    Induction: Oxytocin;AROM        Augmentation: None    Labor Complications: None     Additional Complications:        Cervical Ripening:       None      Delivery Events:  Episiotomy: None    Laceration(s): None      Repaired: None     Number of Repair Packets:      Suture Type and Size:         Estimated Blood Loss (ml): 150        Information for the patient's newbornValla Sit, Female [[id number]]     Delivery Summary - Baby    Delivery Date: 2017   Delivery Time: 6:30 PM   Delivery Type: Vaginal, Spontaneous Delivery  Sex:  female  Gestational Age: 39w0d  Delivery Clinician:  [facility]  Living?: Living   Delivery Location: L&D             APGARS  One minute Five minutes Ten minutes   Skin Color: 1    1       Heart Rate: 2   2         Reflex Irritability: 1   2         Muscle Tone: 2   2       Respiration: 2   2 Total: 8   9           Presentation: Vertex  Position: Left Occiput Anterior  Resuscitation Method:  Tactile Stimulation;Suctioning-bulb     Meconium Stained: None    Cord Information: 3 Vessels   Complications: Nuchal Cord With Compressions  Cord Blood Sent?:  No    Blood Gases Sent?:  No    Placenta:  Date/Time:   6:50 PM  Removal: Spontaneous      Appearance: Normal      Measurements:  Birth Weight:      Birth Length:     Head Circumference:       Chest Circumference:      Abdominal Girth: Other Providers:   Chago Chicas, Fernando Palomino, ANISA BLAKELY;Val MANN Primary Nurse;Primary [place] Nurse; Anesthesiologist;Midwife; Charge Nurse           Cord Blood Results:  Information for the patient's :  Elias Desouzad, Female [[id number]]   No results found for: Maurene César, PCTDIG, BILI, ABORHEXT, ABORH    [facility] the [facility], [facility] [[id number]]   No results found for: APH, APCO2, APO2, AHCO3, ABEC, ABDC, O2ST, [place], [place], PHI, [place], PO2I, HCO3I, SO2I, IBD     Information for the [facility]'s [facility], Female [[id number]]   No results found for: EPHV, PCO2V, PO2V, HCO3V, O2STV, EBDV

## 2017-07-14 NOTE — IP AVS SNAPSHOT
Current Discharge Medication List  
  
CONTINUE these medications which have CHANGED Dose & Instructions Dispensing Information Comments Morning Noon Evening Bedtime  
 labetalol 200 mg tablet Commonly known as:  aRfaela Henriquez What changed:   
- medication strength 
- how much to take Your last dose was: Your next dose is:    
   
   
 Dose:  200 mg Take 1 Tab by mouth two (2) times a day. Indications: hypertension Quantity:  60 Tab Refills:  1 CONTINUE these medications which have NOT CHANGED Dose & Instructions Dispensing Information Comments Morning Noon Evening Bedtime PRENATAL DHA+COMPLETE PRENATAL -300 mg-mcg-mg Cmpk Generic drug:  [id number] ángel-folic-dha Your last dose was: Your next dose is:    
   
   
 Dose:  30 mg/day Take 30 mg/day by mouth daily. Indications: PREGNANCY Refills:  0  
     
   
   
   
  
 VITAMIN D3 2,000 unit Tab Generic drug:  cholecalciferol (vitamin D3) Your last dose was: Your next dose is:    
   
   
 Dose:  4000 Int'l Units Take 4,000 Int'l Units by mouth daily. Refills:  0 STOP taking these medications   
 aspirin 81 mg chewable tablet Where to Get Your Medications Information on where to get these meds will be given to you by the nurse or doctor. ! Ask your nurse or doctor about these medications  
  labetalol 200 mg tablet

## 2017-07-15 PROCEDURE — 65270000029 HC RM PRIVATE

## 2017-07-15 PROCEDURE — 74011250637 HC RX REV CODE- 250/637: Performed by: ADVANCED PRACTICE MIDWIFE

## 2017-07-15 PROCEDURE — 77030021125

## 2017-07-15 RX ADMIN — IBUPROFEN 800 MG: 800 TABLET, FILM COATED ORAL at 23:04

## 2017-07-15 RX ADMIN — HYDROCODONE BITARTRATE AND ACETAMINOPHEN 1 TABLET: 5; 325 TABLET ORAL at 01:40

## 2017-07-15 RX ADMIN — IBUPROFEN 800 MG: 800 TABLET, FILM COATED ORAL at 15:36

## 2017-07-15 RX ADMIN — IBUPROFEN 800 MG: 800 TABLET, FILM COATED ORAL at 07:47

## 2017-07-15 RX ADMIN — LABETALOL HCL 100 MG: 100 TABLET, FILM COATED ORAL at 09:03

## 2017-07-15 RX ADMIN — FERROUS SULFATE TAB 325 MG (65 MG ELEMENTAL FE) 325 MG: 325 (65 FE) TAB at 07:47

## 2017-07-15 NOTE — PROGRESS NOTES
CNMPostpartumNoteDay1  S: Patient ambulating and voiding without difficulty. Patient happy with birth experience. Breastfeeding well. No complaints. O: Vital signs stable, /81 on Labetalol 100 mg BID, Heart RRR without murmur, Lungs CTA bilaterally, FF at umbilicus, lochia rubra light, breasts soft, nipples intact, no edema, negative Joaquín's sign.      A: Postpartum Day 1  Breastfeeding  Blood type  Positive/Rubella immune/GBS positive     P: Continue current postpartum orders  Lactation consult   Anticipate discharge tomorrow  Discharge home on PO labetalol  Had Tdap if has not had pregnancy  Will need f/u in 1 week for BP check

## 2017-07-15 NOTE — PROGRESS NOTES
SBAR IN Report: Mother    Verbal report received from Reuben Marvin RN (full name & credentials) on this patient, who is now being transferred from Labor and Delivery Unit (unit) for routine progression of care. The patient is not wearing a green \"Anesthesia-Duramorph\" band. Report consisted of patient's Situation, Background, Assessment and Recommendations (SBAR). [place] ID bands were compared with the identification form, and verified with the patient and transferring nurse. Information from the SBAR and [facility] Report was reviewed with the transferring nurse; opportunity for questions and clarification provided.

## 2017-07-15 NOTE — ROUTINE PROCESS
Bedside shift change report given to MURPHY Kim RN (oncoming nurse) by Callie Abdalla (offgoing nurse). Report included the following information SBAR, Kardex, Intake/Output, MAR and Recent Results.

## 2017-07-15 NOTE — LACTATION NOTE
This note was copied from a baby's chart. Discussed with mother her plan for feeding. Reviewed the benefits of exclusive breast milk feeding during the hospital stay. Informed her of the risks of using formula to supplement in the first few days of life as well as the benefits of successful breast milk feeding; referred her to the Breastfeeding booklet about this information. She acknowledges understanding of information reviewed and states that it is her plan to breastfeeding her infant. Will support her choice and offer additional information as needed. Reviewed breastfeeding basics:  How milk is made and normal  breastfeeding behaviors discussed. Supply and demand,  stomach size, early feeding cues, skin to skin bonding with comfortable positioning and baby led latch-on reviewed. How to identify signs of successful breastfeeding sessions reviewed; education on assymetrical latch, signs of effective latching vs shallow, in-effective latching, normal  feeding frequency and duration and expected infant output discussed. Normal course of breastfeeding discussed including the AAP's recommendation that children receive exclusive breast milk feedings for the first six months of life with breast milk feedings to continue through the first year of life and/or beyond as complimentary table foods are added. Breastfeeding Booklet and Warm line information provided with discussion. Discussed typical  weight loss and the importance of pediatrician appointment within 24-48 hours of discharge, at 2 weeks of life and normalcy of requesting pediatric weight checks as needed in between visits. Pt will successfully establish breastfeeding by feeding in response to early feeding cues   or wake every 3h, will obtain deep latch, and will keep log of feedings/output. Taught to BF at hunger cues and or q 2-3 hrs and to offer 10-20 drops of hand expressed colostrum at any non-feeds.       Breast Assessment  Left Breast: Medium  Left Nipple: Everted, Intact  Right Breast: Medium  Right Nipple: Everted, Intact  Breast- Feeding Assessment  Attends Breast-Feeding Classes: No  Breast-Feeding Experience: Yes (6 months with first 2, still BF 1year old)  Breast Trauma/Surgery: No  Type/Quality: Good (per mother)  Lactation Consultant Visits  Breast-Feedings: Not breast-feeding  Mother/Infant Observation  Mother Observation:  (baby not at breast)  Infant Observation:  (baby not at breast)  LATCH Documentation  Latch:  (baby not at breast)  Audible Swallowing:  (baby not at breast)  Type of Nipple:  (baby not at breast)  Comfort (Breast/Nipple):  (baby not at breast)  Hold (Positioning):  (baby not at breast)  LATCH Score: 7 (last latch score)  Went back to check in with mother, mother states she forgot to call [facility] and baby fed already, experienced breastfeeding mother, education done. Mother states no questions at this time.

## 2017-07-15 NOTE — LACTATION NOTE
This note was copied from a baby's chart. Went in for [address] visit with mother, father and 3 other children in the room, mother states baby fed recently and is doing very well with fees and not having any trouble latching, mother states she is still breastfeeding her 1year old and is very experienced, requested for mother to call for [address] with next feeding to see latch.

## 2017-07-15 NOTE — PROGRESS NOTES
Bedside shift change report given to [address] (oncoming nurse) by Clyde Caicedo (offgoing nurse). Report included the following information SBAR and MAR.

## 2017-07-16 VITALS
TEMPERATURE: 98.1 F | RESPIRATION RATE: 16 BRPM | WEIGHT: 237 LBS | OXYGEN SATURATION: 100 % | HEIGHT: 64 IN | SYSTOLIC BLOOD PRESSURE: 144 MMHG | BODY MASS INDEX: 40.46 KG/M2 | DIASTOLIC BLOOD PRESSURE: 90 MMHG | HEART RATE: 76 BPM

## 2017-07-16 PROCEDURE — 74011250637 HC RX REV CODE- 250/637: Performed by: ADVANCED PRACTICE MIDWIFE

## 2017-07-16 RX ORDER — LABETALOL 200 MG/1
200 TABLET, FILM COATED ORAL EVERY 12 HOURS
Status: DISCONTINUED | OUTPATIENT
Start: 2017-07-16 | End: 2017-07-16 | Stop reason: HOSPADM

## 2017-07-16 RX ORDER — LABETALOL 200 MG/1
200 TABLET, FILM COATED ORAL 2 TIMES DAILY
Qty: 60 TAB | Refills: 1 | Status: SHIPPED | OUTPATIENT
Start: 2017-07-16 | End: 2017-07-30

## 2017-07-16 RX ADMIN — FERROUS SULFATE TAB 325 MG (65 MG ELEMENTAL FE) 325 MG: 325 (65 FE) TAB at 08:08

## 2017-07-16 RX ADMIN — IBUPROFEN 800 MG: 800 TABLET, FILM COATED ORAL at 08:08

## 2017-07-16 RX ADMIN — LABETALOL HCL 200 MG: 200 TABLET, FILM COATED ORAL at 09:16

## 2017-07-16 NOTE — PROGRESS NOTES
CNMPostpartumNoteDay2- Discharge     S: Patient ambulating and voiding without difficulty. Breastfeeding well. No complaints. Ready to go home. O: Vital signs:   Patient Vitals for the past 24 hrs:   Temp Pulse Resp BP   17 0821 98.1 °F (36.7 °C) 76 16 (!) 157/101   17 0611 98.4 °F (36.9 °C) 86 16 155/90   07/15/17 2304 98 °F (36.7 °C) 80 16 138/79   07/15/17 1537 - 91 16 145/74   07/15/17 0902 98.6 °F (37 °C) 80 16 150/81       Heart RRR without murmur, Lungs CTA bilaterally, FF below umbilicus, lochia rubra light or moderate, breasts soft, nipples intact, no edema, no s/s of DVT     A: Postpartum Day 2- , intact perineum  Breastfeeding  Stable  CHTN/GHTN- On medication     P:   Consulted with Dr. Laury Hugo regarding continued elevated BP while on 100mg labetalol BID. To increase dose to 200mg BID and recheck BP prior to discharge. If 's/90's okay to discharge client. Client to follow up in office in 3 to 5 days for BP check. Strict Pre E precautions. Discussed with client and she verbalized understanding and agreement with plan. Pre E precautions reviewed in detail. Client verbalized understanding of when to call midwife or come to hospital    Rx for Labetalol 200 mg BID. Follow routine postpartum discharge instructions  Discharge home with baby  Ibuprofen OTC up to 800 mg every 6 hours as needed for pain or cramping  Continue PNV while breastfeeding  Continue stool softner until comfortable with bowel movements  Follow-up with CNM in office in 3 to 5 days for BP check.

## 2017-07-16 NOTE — PROGRESS NOTES
8353- SBAR report received; Patient awake in bed holding infant  66 91 21- patient assessment completed, patient reports no pain, motrin given;  assessment completed, infant swaddled in crib  0910- patient holding infant in arms, labetalol 200 mg given   0- paitent wants to take a shower, VS checked B/P is 144/90  1155- birth registrar at bedside  24 [id number]- discharge education and paperwork reviewed, infant placed in car seat and volunteers called for discharge  1227-Pt off unit in stable condition via wheelchair with volunteers for discharge home per Michaela Belt. Pt is to follow-up in  3 days and is aware. Prescriptions given to pt. Pt. Denies any HA, dizziness, NV, or pain at this time. Infant in car seat with mother.

## 2017-07-16 NOTE — DISCHARGE SUMMARY
Obstetrical Discharge Summary     Name: Duc Baez MRN: [id number]  SSN: [id number]    YOB: 1990  Age: 32 y.o. Sex: female      Admit Date: 2017    Discharge Date: 2017     Admitting Physician: Antonio Schaefer CNM     Attending Physician:  Estrella Gilliam MD     * Admission Diagnoses: Labor without complication  Gestational HTN, third trimester    * Discharge Diagnoses:   Information for the patient's :  Bia Scale, Female [[id number]]   Delivery of a 6 lb 14.6 oz (3.135 kg) female infant via Vaginal, Spontaneous Delivery on 2017 at 6:30 PM  by . Apgars were 8 and 9. Additional Diagnoses:   Hospital Problems as of 2017  Date Reviewed: 2017          Codes Class Noted - Resolved POA    Labor without complication IRI-19-OW: U66  ICD-9-CM: 154  2017 - Present Unknown        Gestational HTN ICD-10-CM: O13.9  ICD-9-CM: 642.30  2017 - Present Unknown             Lab Results   Component Value Date/Time    Rubella, External Immune 2017    GrBStrep, External NEGATIVE 2017    ABO,Rh A positive 2014      Immunization History   Administered Date(s) Administered    Tdap 2014, 2017       * Procedures:   * No surgery found *           * Discharge Condition: stable    * Hospital Course: Postpartum course was complicated by elevated BP, which added 0 days to the patient's length of stay. * Disposition: Home    Discharge Medications:   Current Discharge Medication List      CONTINUE these medications which have CHANGED    Details   labetalol (NORMODYNE) 200 mg tablet Take 1 Tab by mouth two (2) times a day. Indications: hypertension  Qty: 60 Tab, Refills: 1         CONTINUE these medications which have NOT CHANGED    Details   cholecalciferol, vitamin D3, (VITAMIN D3) 2,000 unit tab Take 4,000 Int'l Units by mouth daily.       PNV no.24-iron-folic acid-dha (PRENATAL DHA+COMPLETE PRENATAL) -300 mg-mcg-mg cmpk Take 30 mg/day by mouth daily. Indications: PREGNANCY         STOP taking these medications       aspirin 81 mg chewable tablet Comments:   Reason for Stopping:               * Follow-up Care/Patient Instructions:  Increase labetalol to 200mg BID, follow up in office in 3 to 5 days for BP check. Call immediately with any s/s of PreE  Recheck BP prior to discharge, may discharge today if 150/90 or less with recheck. Consulted with Dr. Radha Garg.      Activity: Activity as tolerated and no driving for today, No lifting or Strenuous exercise for 6 and No sex for 6 weeks  Diet: Regular Diet  Wound Care: As directed    Follow-up Information     Follow up With Details Comments MD OBDULIO Velasco/ [address] [id number]  [phone]      Elvie Trevizo CNM In 3 days BP Check [address] [place] [address]  [phone]             Signed By:  Elvie Trevizo CNM     July 16, 2017

## 2017-07-16 NOTE — DISCHARGE INSTRUCTIONS
Congratulations on the birth of your baby! Here are some instructions which you may find helpful during the transition home and first few weeks postpartum. Feel free to call our office ([phone]) anytime to speak with a midwife if you have questions or need resources. Follow-up  Please call our office as soon as possible to make your 3 to 5 day BP check appt as directed    Call immediately or come to hospital with any s/s of Pre Eclampsia. Sudden headache, visual disturbance, right upper quadrant pain or sudden n/v.     Medication  - continue Ibuprofen OTC (over the counter) up to 800 mg every 6 hours as needed for pain or cramping    Prenatal vitamin and supplements   - continue while breastfeeding the same as taken during pregnancy (such as vitamin D3, calcium, iron, B complex, etc)     Red raspberry leaf tea   - may continue drinking for the uterine toning effects as the uterus is returning to its pre-pregnancy state; *this may decrease cramping severity*    Evening primrose oil    - may be continued at a dose of 500 mg daily as it may aid in mild breast discomfort while breastfeeding    Breastfeeding helps   - Mother's Milk Tea, fenugreek (for increasing milk supply)   - Happy Ducts (for clogged ducts) *available for purchase online from [employer] or at [address] in [facility]. org (for resources and tips from lactation consultants)   - see more instructions to follow     Constipation    - increase fluids, herbal tea, fiber, vegetable juices, and prune juice    - glycerine suppositories may be used per package instructions   - Smooth Move tea is often helpful     - docusate sodium 100 mg twice daily may be used, if desired (may continue until you feel comfortable with bowel movements)    Postpartum support groups    - Birth Talk Birth [place]  Support [place] for families interested in having a natural, unmedicated hospital birth  First Monday of every month at 6:30pm  [employer] [facility] - [address] [facility], [address] [facility] [place] [address]  Nohemi Cavanaugh  [phone]  [email or web address]     - Mothering [place]  First Tuesday of every month from 7:00 to 8:30pm  [address]  [address] [facility], [address] [facility], [address]. [email or web address]     - New Mothers Support Group  For mothers of infants up to 16 weeks old  Wednesdays, 12:00 noon-1:30 p.m. [facility] [place] to pre-register and for location information [email or web address] or [phone]     - Postpartum Depression [place]  Third Friday of every month from 10:00-11:00am  [email or web address] (email for location)   - Postpartum Counseling - PPD, Birth [address] [facility], [facility]   [email or web address]            Breast-Feeding: Your Care Instructions     Breast-feeding has many benefits. It may lower your baby's chances of getting an infection. It also may prevent your baby from having problems such as diabetes and high cholesterol later in life. Breast-feeding also helps you bond with your baby. The American Academy of Pediatrics recommends breast-feeding for at least a year. That may be very hard for many women to do, but breast-feeding even for a shorter period of time is a health benefit to you and your baby. In the first days after birth, your breasts make a thick, yellow liquid called colostrum. This liquid gives your baby nutrients and antibodies against infection. It is all that babies need in the first days after birth. Your breasts will fill with milk a few days after the birth. Breast-feeding is a skill that gets better with practice. It is common to have some problems. Some women have sore or cracked nipples, blocked milk ducts, or a breast infection (mastitis). But if you feed your baby every 1 to 2 hours during the day and follow the tips on this sheet, you may not have these problems.  You can treat these problems if they happen and continue breast-feeding. Follow-up care is a key part of your treatment and safety. Be sure to make and go to all appointments, and call your doctor if you are having problems. It's also a good idea to know your test results and keep a list of the medicines you take. How can you care for yourself at home? Breast-feed your baby whenever he or she is hungry. In the first 2 weeks, your baby will feed about every 1 to 3 hours. This will help you keep up your supply of milk. Put a bed pillow or a nursing pillow on your lap to support your arms and your baby. Hold your baby in a comfortable position. You can hold your baby in several ways. One of the most common positions is the cradle hold. One arm supports your baby, with his or her head in the bend of your elbow. Your open hand supports your baby's bottom or back. Your baby's belly lies against yours. If you had your baby by , or , try the football hold. This position keeps your baby off your belly. Tuck your baby under your arm, with his or her body along the side you will be feeding on. Support your baby's upper body with your arm. With that hand you can control your baby's head to bring his or her mouth to your breast.   Try different positions with each feeding. If you are having problems, ask for help from your doctor or a lactation consultant. To get your baby to latch on:   Support and narrow your breast with one hand using a \"U hold,\" with your thumb on the outer side of your breast and your fingers on the inner side. You can also use a \"C hold,\" with all your fingers below the nipple and your thumb above it. Try the different holds to get the deepest latch for whichever breast-feeding position you use. Your other arm is behind your baby's back, with your hand supporting the base of the baby's head. Position your fingers and thumb to point toward your baby's ears.    You can touch your baby's lower lip with your nipple to get your baby to open his or her mouth. Wait until your baby opens up really wide, like a big yawn. Then be sure to bring the baby quickly to your breast--not your breast to the baby. As you bring your baby toward your breast, use your other hand to support the breast and guide it into his or her mouth. Both the nipple and a large portion of the darker area around the nipple (areola) should be in the baby's mouth. The baby's lips should be flared outward, not folded in (inverted). Listen for a regular sucking and swallowing pattern while the baby is feeding. If you cannot see or hear a swallowing pattern, watch the baby's ears, which will wiggle slightly when the baby swallows. If the baby's nose appears to be blocked by your breast, tilt the baby's head back slightly, so just the edge of one nostril is clear for breathing. When your baby is latched, you can usually remove your hand from supporting your breast and bring it under your baby to cradle him or her. Now just relax and breast-feed your baby. You will know that your baby is feeding well when:   His or her mouth covers a lot of the areola, and the lips are flared out. His or her chin and nose rest against your breast.   Sucking is deep and rhythmic, with short pauses. You are able to see and hear your baby swallowing. You do not feel pain in your nipple. If your baby takes only one breast at a feeding, start the next feeding on the other breast.   Anytime you need to remove your baby from the breast, put one finger in the corner of his or her mouth. Push your finger between your baby's gums to gently break the seal. If you do not break the tight seal before you remove your baby, your nipples can become sore, cracked, or bruised. After feeding your baby, gently pat his or her back to let out any swallowed air. After your baby burps, offer the breast again, or offer the other breast. Sometimes a baby will want to keep feeding after being burped.   When should you call for help? Call your doctor now or seek immediate medical care if:   You have symptoms of a breast infection, such as: Increased pain, swelling, redness, or warmth around a breast.   Red streaks extending from the breast.   Pus draining from a breast.   A fever. Your baby has no wet diapers for 6 hours. Watch closely for changes in your health, and be sure to contact your doctor if:   Your baby has trouble latching on to your breast.   You continue to have pain or discomfort when breast-feeding. You have other questions or concerns. After Pregnancy: Exercises   Your Care Instructions   Here are some examples of exercises that can help after pregnancy. Start each exercise slowly. Ease off the exercise if you start to have pain. Your doctor or physical therapist will tell you when you can start these exercises and which ones will work best for you. How to do the exercises   Tummy tuck     1. Lie on your back, and place two fingers just inside your hip bones so you can feel your lower belly muscles. 2. Take a deep breath in.   3. As you breathe out, pull your belly button in toward your spine, as if you are trying to zip up a tight pair of jeans. You should feel your lower belly muscles pull slightly away from your fingers as the muscles tighten. 4. Hold for about 6 seconds, but do not hold your breath. 5. Repeat 8 to 12 times. 6. Repeat several times a day, and try to hold your lower belly muscles in for longer as you get stronger. 7. Practice doing this exercise while you are standing, such as when you are standing in line, or sitting. Heel slides     1. Lie on the floor with your knees bent, and place two fingers just inside your hip bones so you can feel your lower belly muscles. Your feet should be flat on the floor. 2. Pull your belly button in toward your spine. You should feel your lower belly muscles pull slightly away from your fingers as the muscles tighten. 3. Keep holding your belly button in as you slowly slide one foot along the floor until your leg is out straight. 4. Slowly slide the leg back to your starting position while making sure that you keep holding your belly button in.   5. Do not arch or move your back as you are doing this. Do not hold your breath. 6. Relax and repeat with your other leg. 7. Repeat 8 to 12 times. Knee-to-chest stretch     1. Lie on your back with one knee bent and the other leg straight. 2. Clasp your hands together under your bent knee and bring the knee to your chest. Keep your lower back pressed to the floor. 3. If it hurts your back to keep your opposite leg straight as you stretch, bend that knee too, and keep that foot flat on the floor. 4. Hold for at least 15 to 30 seconds. 5. Relax and lower the knee to the starting position. 6. Repeat with the other leg. 7. Repeat 2 to 4 times with each leg. Neck rotation     1. Sit in a firm chair, or stand up straight. 2. Keeping your chin level, turn your head to the right, and hold for 15 to 30 seconds. 3. Turn your head to the left and hold for 15 to 30 seconds. 4. Repeat 2 to 4 times to each side. Shoulder rolls     1. Sit comfortably with your feet shoulder-width apart. You can also do this exercise while standing. 2. Roll your shoulders up, then back, and then down in a smooth, circular motion. 3. Repeat 2 to 4 times. Midback stretch     Note: If you have knee pain, do not do this exercise. 1. Kneel on the floor, and sit back on your ankles. 2. Lean forward, place your hands on the floor, and stretch your arms out in front of you. Rest your head between your arms. 3. Gently push your chest toward the floor, reaching as far in front of you as possible. 4. Hold for 15 to 30 seconds. 5. Repeat 2 to 4 times. Back stretches     1. Get down on your hands and knees on the floor. 2. Relax your head and allow it to droop.  Round your back up toward the ceiling until you feel a nice stretch in your upper, middle, and lower back. Hold this stretch for as long as it feels comfortable, or about 15 to 30 seconds. 3. Return to the starting position with a flat back while you are on your hands and knees. 4. Let your back sway by pressing your stomach toward the floor. Lift your buttocks toward the ceiling. 5. Hold this position for 15 to 30 seconds. 6. Repeat 2 to 4 times. Hamstring stretch (lying down)     1. Lie flat on your back with your legs straight. If you feel discomfort in your back, place a small towel roll under your lower back. 2. Holding the back of your leg, lift your leg straight up and toward your body until you feel a stretch at the back of your thigh. 3. Hold the stretch for at least 30 seconds. 4. Repeat 2 to 4 times. 5. Switch legs and repeat steps 1 through 4. Calf stretch     1. Stand facing a wall with your hands on the wall at about eye level. Put your leg about a step behind your other leg. 2. Keeping your back leg straight and your back heel on the floor, bend your front knee and gently bring your hip and chest toward the wall until you feel a stretch in the calf of your back leg. 3. Hold the stretch for 15 to 30 seconds. 4. Repeat 2 to 4 times. 5. Repeat steps 1 through 4, but this time keep your back knee bent. 6. Switch legs and repeat steps 1 through 5. Follow-up care is a key part of your treatment and safety. Be sure to make and go to all appointments, and call your doctor if you are having problems. It's also a good idea to know your test results and keep a list of the medicines you take. After Your Delivery (the Postpartum Period): After Your Visit   Your Care Instructions   Congratulations on the birth of your baby. Like pregnancy, the  period can be a time of excitement, emmie, and exhaustion. You may look at your wondrous little baby and feel happy.  You may also be overwhelmed by your new sleep hours and new responsibilities. At first, babies often sleep during the days and are awake at night. They do not have a pattern or routine. They may make sudden gasps, jerk themselves awake, or look like they have crossed eyes. These are all normal, and they may even make you smile. In these first weeks after delivery, try to take good care of yourself. It may take 4 to 6 weeks to feel like yourself again, and possibly longer if you had a  birth. You will likely feel very tired for several weeks. Your days will be full of ups and downs, but lots of emmie as well. Follow-up care is a key part of your treatment and safety. Be sure to make and go to all appointments, and call your doctor if you are having problems. It's also a good idea to know your test results and keep a list of the medicines you take. How can you care for yourself at home? Take care of your body after delivery   Use pads instead of tampons for the bloody flow that may last as long as 2 weeks. Ease cramps with ibuprofen (Advil, Motrin). Ease soreness of hemorrhoids and the area between your vagina and rectum with ice compresses or witch hazel pads. Ease constipation by drinking lots of fluid and eating high-fiber foods. Ask your doctor about over-the-counter stool softeners. Cleanse yourself with a gentle squeeze of warm water from a bottle instead of wiping with toilet paper. Take a sitz bath in warm water several times a day. Wear a good nursing bra. Ease sore and swollen breasts with warm, wet washcloths. If you are not breast-feeding, use ice rather than heat for breast soreness. Your period may not start for several months if you are breast-feeding. You may bleed more, and longer at first, than you did before you got pregnant. Wait until you are healed (about 4 to 6 weeks) before you have sexual intercourse. Your doctor will tell you when it is okay to have sex.    Try not to travel with your baby for 5 or 6 weeks. If you take a long car trip, make frequent stops to walk around and stretch. Avoid exhaustion   Rest every day. Try to nap when your baby naps. Ask another adult to be with you for a few days after delivery. Plan for  if you have other children. Stay flexible so you can eat at odd hours and sleep when you need to. Both you and your baby are making new schedules. Plan small trips to get out of the house. Change can make you feel less tired. Ask for help with housework, cooking, and shopping. Remind yourself that your job is to care for your baby. Know about help for postpartum depression   \"Baby blues\" are common for the first 1 to 2 weeks after birth. You may cry or feel sad or irritable for no reason. Rest whenever you can. Being tired makes it harder to handle your emotions. Go for walks with your baby. Talk to your partner, friends, and family about your feelings. If your symptoms last for more than a few weeks, or if you feel very depressed, ask your doctor for help. Postpartum depression can be treated. Support groups and counseling can help. Sometimes medicine can also help. Stay healthy   Eat healthy foods so you have more energy, make good breast milk, and lose extra baby pounds. If you breast-feed, avoid alcohol and drugs. Stay smoke-free. If you quit during pregnancy, congratulations. Start daily exercise after 4 to 6 weeks, but rest when you feel tired. Learn exercises to tone your belly. Do Kegel exercises to regain strength in your pelvic muscles. You can do these exercises while you stand or sit. Squeeze the same muscles you would use to stop your urine. Your belly and rear end (buttocks) should not move. Hold the squeeze for 3 seconds, then relax for 3 seconds. Repeat the exercise 10 to 15 times for each session. Do three or more sessions each day. Find a class for new mothers and new babies that has an exercise time.    If you had a  birth, give yourself a bit more time before you exercise, and be careful. When should you call for help? Call 911 anytime you think you may need emergency care. For example, call if:   You passed out (lost consciousness). Call your doctor now or seek immediate medical care if:   You have severe vaginal bleeding. This means you are passing blood clots and soaking through a pad each hour for 2 or more hours. You are dizzy or lightheaded, or you feel like you may faint. You have a fever. You have new belly pain, or your pain gets worse. Watch closely for changes in your health, and be sure to contact your doctor if:   Your vaginal bleeding seems to be getting heavier. You have new or worse vaginal discharge. You feel sad, anxious, or hopeless for more than a few days. You do not get better as expected. Where can you learn more? Go to Marfeel.be   Enter A461 in the search box to learn more about \"After Your Delivery (the Postpartum Period): After Your Visit. \"    © 5646-5808 Healthwise, Incorporated. Care instructions adapted under license by Sondra Parsons (which disclaims liability or warranty for this information). This care instruction is for use with your licensed healthcare professional. If you have questions about a medical condition or this instruction, always ask your healthcare professional. [place] [address] any warranty or liability for your use of this information. Content Version: 1.6.680211;  Last Revised: March 20, 2012    I

## 2017-07-16 NOTE — PROGRESS NOTES
Bedside shift change report given to [id number] [place] [address] Tavia (oncoming nurse) by Shanta Nichols (offgoing nurse). Report included the following information {SBAR REPORTS [id number]}.

## 2017-07-17 NOTE — ADT AUTH CERT NOTES
Khushboo Harding Trace #: [id number]          Patient : Leeann Cabot     Member ID:  [id number]   Case #:  [id number]   Group Name:  [id number] [facility] [address] #:  [address] Sponsor Name:  [address] [place] [address] #:  [id number]   :  1990   Gender:  Female   Relationship:  Self   Address[facility] [address] [facility]   Plan:  2017-9999     Submitter : [facility] Stella Submitter Type:  Provider   NPI:  [id number]    General Eligibility Information [facility]   Status:  Active Coverage   Insurance Type:  Medicaid        Primary Care Provider:  Bere Saenz   NPI:  [id number]   Telephone:  [phone]   FAX:  [phone]   Address: [address] [place] [address]   PCP Effective Date :  2017-9999      Coverages   Health Benefit Plan Coverage   [facility]           Health Benefit Plan Coverage  Back To Coverage List         Network Notes      In Benefit Description:   MEMBERS BENEFIT PLAN ONLY COVERS URGENT AND EMERGENT SERVICES OUTSIDE OF PARTICIPATING PROVIDERS IN [place] [address]. IF SERVICE RENDERED IS NOT DEEMED URGENT/EMERGENT, SERVICE MAY NOT BE COVERED      Out Benefit Description:   MEMBERS BENEFIT PLAN ONLY COVERS URGENT AND EMERGENT SERVICES OUTSIDE OF PARTICIPATING PROVIDERS IN [facility].   IF SERVICE RENDERED IS NOT DEEMED URGENT/EMERGENT, SERVICE MAY NOT BE COVERED

## 2017-07-25 ENCOUNTER — CLINICAL SUPPORT (OUTPATIENT)
Dept: MIDWIFE SERVICES | Age: 27
End: 2017-07-25

## 2017-07-25 VITALS — DIASTOLIC BLOOD PRESSURE: 91 MMHG | SYSTOLIC BLOOD PRESSURE: 149 MMHG | HEART RATE: 86 BPM

## 2017-07-25 DIAGNOSIS — Z01.30 BLOOD PRESSURE CHECK: Primary | ICD-10-CM

## 2017-07-25 RX ORDER — LABETALOL 100 MG/1
TABLET, FILM COATED ORAL
Refills: 0 | COMMUNITY
Start: 2017-07-05 | End: 2017-07-25 | Stop reason: SDUPTHER

## 2017-07-25 NOTE — PROGRESS NOTES
Subjective: 28yo  2 weeks PP here for a BP check but forgot to take medication this morning. Objective: Blood pressure (!) 149/91, pulse 86,      Assessment: Gestational hypertension post partum    Plan: Continue Labetolol 100mg BID. Instructed patient to take medication as soon as she gets home and continue her second one tonight. Discussed with patient about getting a home blood pressure cuff to monitor BPs at home. Instructed to call Thursday to report blood pressures.

## 2017-07-25 NOTE — PROGRESS NOTES
Chief Complaint   Patient presents with    Hypertension     blood pressure check     Visit Vitals    BP (!) 149/91    Pulse 86     Patient here today for nurse visit to have her BP checked. She is elevated at 149/91 but states she has had a stressful morning as well, as forgot to take her medicine. Khai dave cnm talked with pt about taking her medicine and coming back again to have it checked. Patient agreed.

## 2017-07-29 ENCOUNTER — HOSPITAL ENCOUNTER (OUTPATIENT)
Age: 27
Setting detail: OBSERVATION
Discharge: HOME | End: 2017-07-30
Attending: OBSTETRICS & GYNECOLOGY | Admitting: OBSTETRICS & GYNECOLOGY
Payer: MEDICAID

## 2017-07-29 LAB
ALBUMIN SERPL BCP-MCNC: 4 G/DL (ref 3.5–5)
ALBUMIN/GLOB SERPL: 1.2 {RATIO} (ref 1.1–2.2)
ALP SERPL-CCNC: 86 U/L (ref 45–117)
ALT SERPL-CCNC: 29 U/L (ref 12–78)
ANION GAP BLD CALC-SCNC: 9 MMOL/L (ref 5–15)
AST SERPL W P-5'-P-CCNC: 20 U/L (ref 15–37)
BASOPHILS # BLD AUTO: 0 K/UL (ref 0–0.1)
BASOPHILS # BLD: 0 % (ref 0–1)
BILIRUB SERPL-MCNC: 0.3 MG/DL (ref 0.2–1)
BUN SERPL-MCNC: 10 MG/DL (ref 6–20)
BUN/CREAT SERPL: 13 (ref 12–20)
CALCIUM SERPL-MCNC: 9.2 MG/DL (ref 8.5–10.1)
CHLORIDE SERPL-SCNC: 106 MMOL/L (ref 97–108)
CO2 SERPL-SCNC: 28 MMOL/L (ref 21–32)
CREAT SERPL-MCNC: 0.8 MG/DL (ref 0.55–1.02)
CREAT UR-MCNC: 29.05 MG/DL
EOSINOPHIL # BLD: 0.1 K/UL (ref 0–0.4)
EOSINOPHIL NFR BLD: 2 % (ref 0–7)
ERYTHROCYTE [DISTWIDTH] IN BLOOD BY AUTOMATED COUNT: 15.3 % (ref 11.5–14.5)
GLOBULIN SER CALC-MCNC: 3.3 G/DL (ref 2–4)
GLUCOSE SERPL-MCNC: 82 MG/DL (ref 65–100)
HCT VFR BLD AUTO: 37.8 % (ref 35–47)
HGB BLD-MCNC: 11.8 G/DL (ref 11.5–16)
LYMPHOCYTES # BLD AUTO: 35 % (ref 12–49)
LYMPHOCYTES # BLD: 1.6 K/UL (ref 0.8–3.5)
MCH RBC QN AUTO: 24.5 PG (ref 26–34)
MCHC RBC AUTO-ENTMCNC: 31.2 G/DL (ref 30–36.5)
MCV RBC AUTO: 78.4 FL (ref 80–99)
MONOCYTES # BLD: 0.4 K/UL (ref 0–1)
MONOCYTES NFR BLD AUTO: 9 % (ref 5–13)
NEUTS SEG # BLD: 2.6 K/UL (ref 1.8–8)
NEUTS SEG NFR BLD AUTO: 54 % (ref 32–75)
PLATELET # BLD AUTO: 195 K/UL (ref 150–400)
POTASSIUM SERPL-SCNC: 4 MMOL/L (ref 3.5–5.1)
PROT SERPL-MCNC: 7.3 G/DL (ref 6.4–8.2)
PROT UR-MCNC: <6 MG/DL (ref 0–11.9)
PROT/CREAT UR-RTO: NORMAL
RBC # BLD AUTO: 4.82 M/UL (ref 3.8–5.2)
SODIUM SERPL-SCNC: 143 MMOL/L (ref 136–145)
WBC # BLD AUTO: 4.7 K/UL (ref 3.6–11)

## 2017-07-29 PROCEDURE — 74011250637 HC RX REV CODE- 250/637: Performed by: OBSTETRICS & GYNECOLOGY

## 2017-07-29 PROCEDURE — 77030034850

## 2017-07-29 PROCEDURE — 36415 COLL VENOUS BLD VENIPUNCTURE: CPT | Performed by: OBSTETRICS & GYNECOLOGY

## 2017-07-29 PROCEDURE — 99285 EMERGENCY DEPT VISIT HI MDM: CPT | Performed by: OBSTETRICS & GYNECOLOGY

## 2017-07-29 PROCEDURE — 85025 COMPLETE CBC W/AUTO DIFF WBC: CPT | Performed by: OBSTETRICS & GYNECOLOGY

## 2017-07-29 PROCEDURE — 99218 HC RM OBSERVATION: CPT

## 2017-07-29 PROCEDURE — 74011250637 HC RX REV CODE- 250/637: Performed by: FAMILY MEDICINE

## 2017-07-29 PROCEDURE — 77030011943

## 2017-07-29 PROCEDURE — 80053 COMPREHEN METABOLIC PANEL: CPT | Performed by: OBSTETRICS & GYNECOLOGY

## 2017-07-29 PROCEDURE — 75810000275 HC EMERGENCY DEPT VISIT NO LEVEL OF CARE

## 2017-07-29 PROCEDURE — 84156 ASSAY OF PROTEIN URINE: CPT | Performed by: OBSTETRICS & GYNECOLOGY

## 2017-07-29 RX ORDER — DOCUSATE SODIUM 100 MG/1
100 CAPSULE, LIQUID FILLED ORAL 2 TIMES DAILY
COMMUNITY
End: 2017-09-11 | Stop reason: ALTCHOICE

## 2017-07-29 RX ORDER — LABETALOL 100 MG/1
100 TABLET, FILM COATED ORAL
Status: COMPLETED | OUTPATIENT
Start: 2017-07-29 | End: 2017-07-29

## 2017-07-29 RX ORDER — LANOLIN ALCOHOL/MO/W.PET/CERES
CREAM (GRAM) TOPICAL
COMMUNITY
End: 2017-09-11 | Stop reason: ALTCHOICE

## 2017-07-29 RX ORDER — LABETALOL 200 MG/1
200 TABLET, FILM COATED ORAL 3 TIMES DAILY
Status: DISCONTINUED | OUTPATIENT
Start: 2017-07-29 | End: 2017-07-29

## 2017-07-29 RX ORDER — LABETALOL 200 MG/1
200 TABLET, FILM COATED ORAL 3 TIMES DAILY
Qty: 90 TAB | Refills: 2 | Status: SHIPPED | OUTPATIENT
Start: 2017-07-29 | End: 2017-09-11

## 2017-07-29 RX ORDER — ACETAMINOPHEN 325 MG/1
650 TABLET ORAL
Status: DISCONTINUED | OUTPATIENT
Start: 2017-07-29 | End: 2017-07-30 | Stop reason: HOSPADM

## 2017-07-29 RX ADMIN — LABETALOL HCL 200 MG: 200 TABLET, FILM COATED ORAL at 14:29

## 2017-07-29 RX ADMIN — LABETALOL HCL 100 MG: 100 TABLET, FILM COATED ORAL at 18:04

## 2017-07-29 RX ADMIN — ACETAMINOPHEN 650 MG: 325 TABLET ORAL at 19:19

## 2017-07-29 RX ADMIN — LABETALOL HCL 300 MG: 200 TABLET, FILM COATED ORAL at 22:01

## 2017-07-29 NOTE — IP AVS SNAPSHOT
Fransisco Hammad 
 
 
 [address] [place] [address] 
[phone] Patient: Kimberley Orourke 
MRN: [id number] Current Discharge Medication List  
  
CONTINUE these medications which have CHANGED Dose & Instructions Dispensing Information Comments Morning Noon Evening Bedtime * labetalol 200 mg tablet Commonly known as:  Liz Lo What changed:  when to take this Your last dose was: Your next dose is:    
   
   
 Dose:  200 mg Take 1 Tab by mouth three (3) times daily. Quantity:  90 Tab Refills:  2  
     
   
   
   
  
 * labetalol 300 mg tablet Commonly known as:  Liz Lo What changed: You were already taking a medication with the same name, and this prescription was added. Make sure you understand how and when to take each. Your last dose was: Your next dose is:    
   
   
 Dose:  300 mg Take 1 Tab by mouth three (3) times daily. Quantity:  42 Tab Refills:  0  
     
   
   
   
  
 * Notice: This list has 2 medication(s) that are the same as other medications prescribed for you. Read the directions carefully, and ask your doctor or other care provider to review them with you. CONTINUE these medications which have NOT CHANGED Dose & Instructions Dispensing Information Comments Morning Noon Evening Bedtime COLACE 100 mg capsule Generic drug:  docusate sodium Your last dose was: Your next dose is:    
   
   
 Dose:  100 mg Take 100 mg by mouth two (2) times a day. Refills:  0  
     
   
   
   
  
 ferrous sulfate 325 mg (65 mg iron) tablet Your last dose was: Your next dose is: Take  by mouth Daily (before breakfast). Refills:  0 PRENATAL DHA+COMPLETE PRENATAL -300 mg-mcg-mg Cmpk Generic drug:  AFYYICLD32-MNBW ángel-folic-dha Your last dose was: Your next dose is: Dose:  30 mg/day Take 30 mg/day by mouth daily. Indications: PREGNANCY Refills:  0  
     
   
   
   
  
 VITAMIN D3 2,000 unit Tab Generic drug:  cholecalciferol (vitamin D3) Your last dose was: Your next dose is:    
   
   
 Dose:  4000 Int'l Units Take 4,000 Int'l Units by mouth daily. Refills:  0 Where to Get Your Medications These medications were sent to [facility]pharmacy #[id number]- [place], [address], [address] [place], [place] [address]t[place] [address] Phone:  [phone]  
  labetalol 300 mg tablet Information on where to get these meds will be given to you by the nurse or doctor. ! Ask your nurse or doctor about these medications  
  labetalol 200 mg tablet

## 2017-07-29 NOTE — H&P
History & Physical    Name: Addy Dey MRN: [id number]  SSN: [id number]    YOB: 1990  Age: 32 y.o. Sex: female      Subjective:     Reason for Admission: High blood pressure and headaches    History of Present Illness: Ms. Daylin Souza is a 32 y.o. [id number]  female who delivered via  on XF without complications with a midwife at [facility]. Pregnancy was complicated by HTN controlled with Labetolol 200mg BID and states her systolic pressure typically ranged from 140-150s. She denies any proteinuria with last pregnancy until admission to L&D. Protein/Cr ratio was 0.5. Patient continued to have elevated pressures post partum but denied any symptoms during hospital course. Never received magnesium during hospital stay. Followed up with her midwife  for a BP check where her blood pressure was elevated. The midwife ordered her a home BP cuff which she used for the first time today and was found to have a BP of 166/108. States she has had headaches since . States she currently has a headache and it has been constant. She denies RUQ pain and blurred vision currently. Is no longer taking her Motrin and states she took her Labetolol this morning as scheduled. States she does not have a PCP and has been unable to follow up between pregnancies to see if this is a chronic issue. States her mother had elevated blood pressure from a young age as well.      1st delivery - preeclampsia  2nd delivery - GHTN  3rd delivery - GHTN controlled with labetolol, discussed chronic tx post partum but was breastfeeding  4th delivery - GHTN controlled with labetolol    ROS: Denies RUQ pain, blurred vision, fever, chills ,polydipsia, polyuria, dysuria, swelling in legs    OB History    Para Term  AB Living   7 4 3 1 3 4   SAB TAB Ectopic Molar Multiple Live Births   3 0 0  0 4      # Outcome Date GA Lbr Max/2nd Weight Sex Delivery Anes PTL Lv   7 Term 17 39w0d  3.135 kg F Vag-Spont EPIDURAL AN N MALOU   6 Term 14 37w3d 03:13 / 00:10 2.76 kg M VAGINAL DELI None N MALOU   5 SAB 13           4 Term 09/22/10 38w0d   F VAGINAL DELI EPI  MALOU   3 SAB 09           2  08 35w0d   M VAGINAL DELI EPI  MALOU      Complications: Preeclampsia   1 SAB                 Past Medical History:   Diagnosis Date    Abnormal Pap smear     abdnormal at 1st PNV will f/u after preg.  Anemia     Essential hypertension     CHTN    Hyperhidrosis     Hypertension     Preeclampsia      Past Surgical History:   Procedure Laterality Date    HX OTHER SURGICAL      dialation and curretage     Social History     Occupational History    Not on file. Social History Main Topics    Smoking status: Never Smoker    Smokeless tobacco: Never Used    Alcohol use No    Drug use: No    Sexual activity: Yes     Partners: Male     Birth control/ protection: None      Family History   Problem Relation Age of Onset    Diabetes Mother     Breast Cancer Maternal Aunt     Diabetes Maternal Grandmother     Heart Disease Mother     Cancer Mother        No Known Allergies  Prior to Admission medications    Medication Sig Start Date End Date Taking? Authorizing Provider   docusate sodium (COLACE) 100 mg capsule Take 100 mg by mouth two (2) times a day. Yes Historical Provider   ferrous sulfate 325 mg (65 mg iron) tablet Take  by mouth Daily (before breakfast). Yes Historical Provider   labetalol (NORMODYNE) 200 mg tablet Take 1 Tab by mouth two (2) times a day. Indications: hypertension 17  Yes Vilma Melton CNM   cholecalciferol, vitamin D3, (VITAMIN D3) 2,000 unit tab Take 4,000 Int'l Units by mouth daily. Yes Historical Provider   PNV no.24-iron-folic acid-dha (PRENATAL DHA+COMPLETE PRENATAL) -300 mg-mcg-mg cmpk Take 30 mg/day by mouth daily.  Indications: PREGNANCY   Yes Historical Provider        Review of Systems:  A comprehensive review of systems was negative except for that written in the History of Present Illness. Objective:     Vitals:    Vitals:    17 1345 17 1400 17 1405 17 1410   BP: (!) 177/105      Pulse: 60      Resp:       Temp:       SpO2:  95% 99% 99%   Weight:       Height:          Temp (24hrs), Av.8 °F (37.1 °C), Min:98.5 °F (36.9 °C), Max:99.1 °F (37.3 °C)    BP  Min: 149/99  Max: 177/105     Physical Exam:  Heart: Regular rate and rhythm, S1S2 present or without murmur or extra heart sounds  Lung: clear to auscultation throughout lung fields, no wheezes, no rales, no rhonchi and normal respiratory effort  Abdomen: soft, nontender  Fundus: firm below level of umbilicus   Lower Extremities: mild edema, no tenderness to palpation    Lab/Data Review:  Recent Results (from the past 24 hour(s))   CBC WITH AUTOMATED DIFF    Collection Time: 17  1:08 PM   Result Value Ref Range    WBC 4.7 3.6 - 11.0 K/uL    RBC 4.82 3.80 - 5.20 M/uL    HGB 11.8 11.5 - 16.0 g/dL    HCT 37.8 35.0 - 47.0 %    MCV 78.4 (L) 80.0 - 99.0 FL    MCH 24.5 (L) 26.0 - 34.0 PG    MCHC 31.2 30.0 - 36.5 g/dL    RDW 15.3 (H) 11.5 - 14.5 %    PLATELET 940 383 - 640 K/uL    NEUTROPHILS 54 32 - 75 %    LYMPHOCYTES 35 12 - 49 %    MONOCYTES 9 5 - 13 %    EOSINOPHILS 2 0 - 7 %    BASOPHILS 0 0 - 1 %    ABS. NEUTROPHILS 2.6 1.8 - 8.0 K/UL    ABS. LYMPHOCYTES 1.6 0.8 - 3.5 K/UL    ABS. MONOCYTES 0.4 0.0 - 1.0 K/UL    ABS. EOSINOPHILS 0.1 0.0 - 0.4 K/UL    ABS.  BASOPHILS 0.0 0.0 - 0.1 K/UL   METABOLIC PANEL, COMPREHENSIVE    Collection Time: 17  1:08 PM   Result Value Ref Range    Sodium 143 136 - 145 mmol/L    Potassium 4.0 3.5 - 5.1 mmol/L    Chloride 106 97 - 108 mmol/L    CO2 28 21 - 32 mmol/L    Anion gap 9 5 - 15 mmol/L    Glucose 82 65 - 100 mg/dL    BUN 10 6 - 20 MG/DL    Creatinine 0.80 0.55 - 1.02 MG/DL    BUN/Creatinine ratio 13 12 - 20      GFR est AA >60 >60 ml/min/1.73m2    GFR est non-AA >60 >60 ml/min/1.73m2    Calcium 9.2 8.5 - 10.1 MG/DL Bilirubin, total 0.3 0.2 - 1.0 MG/DL    ALT (SGPT) 29 12 - 78 U/L    AST (SGOT) 20 15 - 37 U/L    Alk. phosphatase 86 45 - 117 U/L    Protein, total 7.3 6.4 - 8.2 g/dL    Albumin 4.0 3.5 - 5.0 g/dL    Globulin 3.3 2.0 - 4.0 g/dL    A-G Ratio 1.2 1.1 - 2.2         Assessment and Plan:     Ms. Katerine Tellez is a 32 y.o. [id number]  female who delivered via  on 17 and was admitted for preeclampsia and headaches. 1. CBC and CMP within normal limits. 2. Protein/Cr ratio 0.5 on admission 17 - will need to straight cath patient as she is still having light lochia to reasses today  3. Tylenol PRN for headaches. 4. Labetolol 200 mg TID per Dr. Amanda Springer, dose has been given. 5. Reevaluate blood pressure in 20 min to assess if patient will need additional antihypertensives. 6. Does not need magensium at this time per Dr. Amanda Springer. 7. Needs to follow up with PCP to assess if this is chronic HTN. 8. Patient is currently breastfeeding.     Attempted to call and page Dr. Janet Mayberry  Patient discussed with MD Familia Dior DO  Family Medicine Resident

## 2017-07-29 NOTE — DISCHARGE SUMMARY
Gynecology Discharge Summary     Name: Moise Esparza MRN: [id number]  SSN: [id number]    YOB: 1990  Age: 32 y.o. Sex: female      Admit Date: 7/29/2017    Discharge Date: 7/29/2017      Admitting Physician: Janet Hurst MD     Discharge Physician: Suresh Jack DO     Admission Diagnoses: Preeclampsia with Headache    Discharge Diagnoses: Preeclampsia with Headache  Problem List as of 7/29/2017  Date Reviewed: 7/25/2017          Codes Class Noted - Resolved    Labor without complication [id number]: R81  ICD-9-CM: 650  7/14/2017 - Present        Gestational HTN ICD-10-CM: O13.9  ICD-9-CM: 642.30  7/14/2017 - Present        Vitamin D deficiency ICD-10-CM: E55.9  ICD-9-CM: 268.9  2/6/2017 - Present    Overview Signed 2/6/2017 11:35 AM by Petey Lara CNM     2/6 Begin 4000 international units of Vit D 3. Benign essential HTN, chronic, antepartum ICD-10-CM: O10.019  ICD-9-CM: 642.03  12/19/2016 - Present        Hx of pre-eclampsia in prior pregnancy, currently pregnant ICD-10-CM: O09.299  ICD-9-CM: V23.49  12/19/2016 - Present        Pregnancy ICD-10-CM: Z33.1  ICD-9-CM: V22.2  7/31/2014 - Present             Hospital Course: Patient was admitted to observation on L&D 27/29/17 for Preeclampsia and headaches. Patient had been previously diagnosed with preeclampsia on admission to L&D for delivery 7/14/17. Patient had BP range from 149-177/. CBC and CMP WNL, Protein/Cr ratio could not be calculated as protein <6. Changed Labetolol to 200mg PO TID and gave Tylenol PRN for headache. BP stabilized to 158/97. Patient is okay to be D/C per Dr. Andrzej Berrios and follow up in 1 week with Dr. Maynard Schaller office. Condition at Discharge: Stable    Disposition:  Home    Patient Instructions:   Current Discharge Medication List      CONTINUE these medications which have CHANGED    Details   labetalol (NORMODYNE) 200 mg tablet Take 1 Tab by mouth three (3) times daily.   Qty: 90 Tab, Refills: 2         CONTINUE these medications which have NOT CHANGED    Details   docusate sodium (COLACE) 100 mg capsule Take 100 mg by mouth two (2) times a day. ferrous sulfate 325 mg (65 mg iron) tablet Take  by mouth Daily (before breakfast). cholecalciferol, vitamin D3, (VITAMIN D3) 2,000 unit tab Take 4,000 Int'l Units by mouth daily. PNV no.24-iron-folic acid-dha (PRENATAL DHA+COMPLETE PRENATAL) -300 mg-mcg-mg cmpk Take 30 mg/day by mouth daily.  Indications: PREGNANCY             Follow-up Information     Follow up With Details Comments MD OBDULIO Velasco/ Herman Coppola [phone] [phone]            Signed By:  Caleb Agudelo DO  Family Medicine Resident

## 2017-07-29 NOTE — ED TRIAGE NOTES
Pt reports that she gave birth on 7/14/17, pt reports headaches intermittent for the past couple of days, left side pain, pt reports she took her B/P today and it was elevated.   Called L and D, with report, brought to L and D by Ana Nance PCT

## 2017-07-29 NOTE — PROGRESS NOTES
Residents ntfd of pt's arrival and Dr Demetrius Perkins states he will come and see pt    [phone] Dr. Demetrius Perkins called and was ntfd of b/p ranges, stating she is calling Dr. Viola Daniels now to receive orders    1403 pulse ox not reading accurately, pulse ox repositioned on pt's finger for accurate reading    [phone] pt stating she feels better, Dr. Demetrius Perkins aware of b/p ranges    [phone] spoke with Dr Tamara Greene  Regarding b/p ranges, order received to change labetalol dose to 300 mg tid and to observe pt over night    1903 sbar report given to cliff Patterson rn

## 2017-07-29 NOTE — DISCHARGE INSTRUCTIONS
Please follow up with Dr. Kenna Coreas office in 1 week for BP check. Learning About Preeclampsia After Childbirth  What is preeclampsia? A woman with preeclampsia has blood pressure that is higher than usual. She may also have other serious symptoms. Preeclampsia can be dangerous. When it is severe, it can cause seizures (eclampsia) or liver or kidney damage. When the liver is affected, some women get HELLP syndrome, a blood-clotting and bleeding problem. HELLP can come on quickly and can be deadly. This is why your doctor checks you and your baby often. Preeclampsia usually occurs after 20 weeks of pregnancy. In rare cases, it is first noted right after childbirth. Most often, it starts near the end of pregnancy and goes away after childbirth. What are the symptoms? Mild preeclampsia usually doesn't cause symptoms. But preeclampsia can cause rapid weight gain and sudden swelling of the hands and face. Severe preeclampsia does cause symptoms. It can cause a very bad headache and trouble seeing and breathing. It also can cause belly pain. You may also urinate less than usual.  If you have new preeclampsia symptoms after you go home from the hospital, call your doctor right away. What can you expect after you have had preeclampsia? In the hospital  After the baby and the placenta are delivered, preeclampsia usually starts to improve. Most women get better in the first few days after childbirth. After having preeclampsia, you still have a risk of seizures for a day or more after childbirth. (Very rarely, seizures happen later on.) So your doctor may have you take magnesium sulfate for a day or more to prevent seizures. You may also take medicine to lower your blood pressure. When you go home  Your blood pressure will most likely return to normal a few days after delivery.  Your doctor will want to check your blood pressure sometime in the first week after you leave the hospital.  Some women still have high blood pressure 6 weeks after childbirth. But most return to normal levels over the long term. · Take and record your blood pressure at home if your doctor tells you to. ¨ Learn the importance of the two measures of blood pressure (such as 120 over 80, or 120/80). The first number is the systolic pressure. This is the force of blood on the artery walls as the heart pumps. The second number is the diastolic pressure. This is the force of blood on the artery walls between heartbeats, when the heart is at rest. You have a choice of monitors to use. § Manual monitor: You pump up the cuff and use a stethoscope to listen for your pulse. § Electronic monitor: The cuff inflates, and a gauge shows your pulse rate. ¨ To take your blood pressure:  § Ask your doctor to check your blood pressure monitor to be sure that it is accurate and that the cuff fits you. Also ask your doctor to watch you use it, to make sure that you are using it right. § You should not eat, use tobacco products, or use medicine known to raise blood pressure (such as some nasal decongestant sprays) before you take your blood pressure. § Avoid taking your blood pressure if you have just exercised or are nervous or upset. Rest at least 15 minutes before you take your blood pressure. · Be safe with medicines. If you take medicine, take it exactly as prescribed. Call your doctor if you think you are having a problem with your medicine. · Do not smoke. Quitting smoking will help lower your blood pressure and improve your baby's growth and health. If you need help quitting, talk to your doctor about stop-smoking programs and medicines. These can increase your chances of quitting for good. · Eat a balanced and healthy diet that has lots of fruits and vegetables. Long-term health  After you have had preeclampsia, you have a higher-than-average risk of heart disease, stroke, and kidney disease.  This may be because the same things that cause preeclampsia also cause heart and kidney disease. To protect your health, work with your doctor on living a heart-healthy lifestyle and getting the checkups you need. Your doctor may also want you to check your blood pressure at home. Follow-up care is a key part of your treatment and safety. Be sure to make and go to all appointments, and call your doctor if you are having problems. It's also a good idea to know your test results and keep a list of the medicines you take. Where can you learn more? Go to http://heidy-maria de jesus.info/. Enter K608 in the search box to learn more about \"Learning About Preeclampsia After Childbirth. \"  Current as of: March 16, 2017  Content Version: 11.3  © 7205-6484 [employer]. Care instructions adapted under license by [employer] (which disclaims liability or warranty for this information).  If you have questions about a medical condition or this instruction, always ask your healthcare professional. [place] [address] any warranty or liability for your use of this information.

## 2017-07-29 NOTE — PROGRESS NOTES
1316:  The patient presents to labor and delivery complaining of headache and left upper quadrant pain. She states she took her blood pressure at home and it was elevated.

## 2017-07-29 NOTE — IP AVS SNAPSHOT
Deedee Moya 
 
 
 [address] [facility] 100[address] [facility] 
[phone] Patient: Brian Kim 
MRN: [id number] EEM:8[birth year] You are allergic to the following No active allergies Recent Documentation Height Weight Breastfeeding? BMI OB Status Smoking Status 1.626 m 104.3 kg Yes 39.48 kg/m2 Recent pregnancy Never Smoker Unresulted Labs Order Current Status MISC. LAB TEST In process Emergency Contacts Name Discharge Info Relation Home Work Mobile Abrahan Esparza DISCHARGE CAREGIVER [3] Boyfriend [17] [phone] About your hospitalization You were admitted on:  N/A You last received care in the:  [facility] [place] [facility] LABOR & DELIVERY You were discharged on:  July 30, 2017 Unit phone number:  [phone] Why you were hospitalized Your primary diagnosis was:  Not on File Your diagnoses also included:  Pregnancy Providers Seen During Your Hospitalizations Provider Role Specialty Primary office phone Corey Kay MD Attending Provider Obstetrics & Gynecology [phone] Rhoda Thibodeaux MD Attending Provider Obstetrics & Gynecology [phone] Your Primary Care Physician (PCP) Primary Care Physician Office Phone Office Fax Morena Orix [phone] Follow-up Information Follow up With Details Comments Contact Info Abner Fishman MD   [facility] [address] 
[phone] Your Appointments Monday September 11, 2017 11:00 AM EDT POSTPARTUM VISIT with Yue Ramirez CNM [address] [facility] ([address]) [address] [facility]. [place] [address] 100[address] [facility]  
[phone] Current Discharge Medication List  
  
CONTINUE these medications which have CHANGED Dose & Instructions Dispensing Information Comments Morning Noon Evening Bedtime * labetalol 200 mg tablet Commonly known as:  Belkis Benavides What changed:  when to take this Your last dose was: Your next dose is:    
   
   
 Dose:  200 mg Take 1 Tab by mouth three (3) times daily. Quantity:  90 Tab Refills:  2  
     
   
   
   
  
 * labetalol 300 mg tablet Commonly known as:  Belkis Benavides What changed: You were already taking a medication with the same name, and this prescription was added. Make sure you understand how and when to take each. Your last dose was: Your next dose is:    
   
   
 Dose:  300 mg Take 1 Tab by mouth three (3) times daily. Quantity:  42 Tab Refills:  0  
     
   
   
   
  
 * Notice: This list has 2 medication(s) that are the same as other medications prescribed for you. Read the directions carefully, and ask your doctor or other care provider to review them with you. CONTINUE these medications which have NOT CHANGED Dose & Instructions Dispensing Information Comments Morning Noon Evening Bedtime COLACE 100 mg capsule Generic drug:  docusate sodium Your last dose was: Your next dose is:    
   
   
 Dose:  100 mg Take 100 mg by mouth two (2) times a day. Refills:  0  
     
   
   
   
  
 ferrous sulfate 325 mg (65 mg iron) tablet Your last dose was: Your next dose is: Take  by mouth Daily (before breakfast). Refills:  0 PRENATAL DHA+COMPLETE PRENATAL -300 mg-mcg-mg Cmpk Generic drug:  [id number] ángel-folic-dha Your last dose was: Your next dose is:    
   
   
 Dose:  30 mg/day Take 30 mg/day by mouth daily. Indications: PREGNANCY Refills:  0  
     
   
   
   
  
 VITAMIN D3 2,000 unit Tab Generic drug:  cholecalciferol (vitamin D3) Your last dose was: Your next dose is:    
   
   
 Dose:  4000 Int'l Units Take 4,000 Int'l Units by mouth daily. Refills:  0 Where to Get Your Medications These medications were sent to [id number] [place], [address], [address] [address] Phone:  [phone]  
  labetalol 300 mg tablet Information on where to get these meds will be given to you by the nurse or doctor. ! Ask your nurse or doctor about these medications  
  labetalol 200 mg tablet Discharge Instructions Please follow up with Dr. Juan Carlos Diaz office in 1 week for BP check. Learning About Preeclampsia After Childbirth What is preeclampsia? A woman with preeclampsia has blood pressure that is higher than usual. She may also have other serious symptoms. Preeclampsia can be dangerous. When it is severe, it can cause seizures (eclampsia) or liver or kidney damage. When the liver is affected, some women get HELLP syndrome, a blood-clotting and bleeding problem. HELLP can come on quickly and can be deadly. This is why your doctor checks you and your baby often. Preeclampsia usually occurs after 20 weeks of pregnancy. In rare cases, it is first noted right after childbirth. Most often, it starts near the end of pregnancy and goes away after childbirth. What are the symptoms? Mild preeclampsia usually doesn't cause symptoms. But preeclampsia can cause rapid weight gain and sudden swelling of the hands and face. Severe preeclampsia does cause symptoms. It can cause a very bad headache and trouble seeing and breathing. It also can cause belly pain. You may also urinate less than usual. 
If you have new preeclampsia symptoms after you go home from the hospital, call your doctor right away. What can you expect after you have had preeclampsia? In the hospital 
After the baby and the placenta are delivered, preeclampsia usually starts to improve. Most women get better in the first few days after childbirth. After having preeclampsia, you still have a risk of seizures for a day or more after childbirth. (Very rarely, seizures happen later on.) So your doctor may have you take magnesium sulfate for a day or more to prevent seizures. You may also take medicine to lower your blood pressure. When you go home Your blood pressure will most likely return to normal a few days after delivery. Your doctor will want to check your blood pressure sometime in the first week after you leave the hospital. 
Some women still have high blood pressure 6 weeks after childbirth. But most return to normal levels over the long term. · Take and record your blood pressure at home if your doctor tells you to. ¨ Learn the importance of the two measures of blood pressure (such as 120 over 80, or 120/80). The first number is the systolic pressure. This is the force of blood on the artery walls as the heart pumps. The second number is the diastolic pressure. This is the force of blood on the artery walls between heartbeats, when the heart is at rest. You have a choice of monitors to use. § Manual monitor: You pump up the cuff and use a stethoscope to listen for your pulse. § Electronic monitor: The cuff inflates, and a gauge shows your pulse rate. ¨ To take your blood pressure: § Ask your doctor to check your blood pressure monitor to be sure that it is accurate and that the cuff fits you. Also ask your doctor to watch you use it, to make sure that you are using it right. § You should not eat, use tobacco products, or use medicine known to raise blood pressure (such as some nasal decongestant sprays) before you take your blood pressure. § Avoid taking your blood pressure if you have just exercised or are nervous or upset. Rest at least 15 minutes before you take your blood pressure. · Be safe with medicines. If you take medicine, take it exactly as prescribed. Call your doctor if you think you are having a problem with your medicine. · Do not smoke. Quitting smoking will help lower your blood pressure and improve your baby's growth and health. If you need help quitting, talk to your doctor about stop-smoking programs and medicines. These can increase your chances of quitting for good. · Eat a balanced and healthy diet that has lots of fruits and vegetables. Long-term health After you have had preeclampsia, you have a higher-than-average risk of heart disease, stroke, and kidney disease. This may be because the same things that cause preeclampsia also cause heart and kidney disease. To protect your health, work with your doctor on living a heart-healthy lifestyle and getting the checkups you need. Your doctor may also want you to check your blood pressure at home. Follow-up care is a key part of your treatment and safety. Be sure to make and go to all appointments, and call your doctor if you are having problems. It's also a good idea to know your test results and keep a list of the medicines you take. Where can you learn more? Go to http://heidy-maria de jesus.info/. Enter Z623 in the search box to learn more about \"Learning About Preeclampsia After Childbirth. \" 
Current as of: March 16, 2017 Content Version: 11.3 © 8787-2460 [employer]. Care instructions adapted under license by [employer] (which disclaims liability or warranty for this information). If you have questions about a medical condition or this instruction, always ask your healthcare professional. [place] [address] any warranty or liability for your use of this information. 
  
 
 
Discharge Orders None [facility] Announcement We are excited to announce that we are making your provider's discharge notes available to you in [employer]. You will see these notes when they are completed and signed by the physician that discharged you from your recent hospital stay.   If you have any questions or concerns about any information you see in [employer], please call the Health Information Department where you were seen or reach out to your Primary Care Provider for more information about your plan of care. Introducing [facility]! Earline Liao introduces [employer] patient portal. Now you can access parts of your medical record, email your doctor's office, and request medication refills online. 1. In your internet browser, go to [email or web address] 2. Click on the First Time User? Click Here link in the Sign In box. You will see the New Member Sign Up page. 3. Enter your [employer] Access Code exactly as it appears below. You will not need to use this code after youve completed the sign-up process. If you do not sign up before the expiration date, you must request a new code. · [employer] Access Code: [email or web address] Expires: 9/17/2017 11:47 AM 
 
4. Enter the last four digits of your Social Security Number (xxxx) and Date of Birth (mm/dd/yyyy) as indicated and click Submit. You will be taken to the next sign-up page. 5. Create a [employer] ID. This will be your [employer] login ID and cannot be changed, so think of one that is secure and easy to remember. 6. Create a [employer] password. You can change your password at any time. 7. Enter your Password Reset Question and Answer. This can be used at a later time if you forget your password. 8. Enter your e-mail address. You will receive e-mail notification when new information is available in [address] 9. Click Sign Up. You can now view and download portions of your medical record. 10. Click the Download Summary menu link to download a portable copy of your medical information. If you have questions, please visit the Frequently Asked Questions section of the [employer] website. Remember, [employer] is NOT to be used for urgent needs. For medical emergencies, dial 911. Now available from your iPhone and Android! General Information Please provide this summary of care documentation to your next provider. Patient Signature:  ____________________________________________________________ Date:  ____________________________________________________________  
  
Jerral Salem Provider Signature:  ____________________________________________________________ Date:  ____________________________________________________________

## 2017-07-30 VITALS
OXYGEN SATURATION: 99 % | HEIGHT: 64 IN | TEMPERATURE: 98.6 F | WEIGHT: 230 LBS | SYSTOLIC BLOOD PRESSURE: 141 MMHG | RESPIRATION RATE: 16 BRPM | HEART RATE: 82 BPM | DIASTOLIC BLOOD PRESSURE: 73 MMHG | BODY MASS INDEX: 39.27 KG/M2

## 2017-07-30 PROCEDURE — 74011250637 HC RX REV CODE- 250/637: Performed by: FAMILY MEDICINE

## 2017-07-30 PROCEDURE — 74011250637 HC RX REV CODE- 250/637: Performed by: OBSTETRICS & GYNECOLOGY

## 2017-07-30 PROCEDURE — 99218 HC RM OBSERVATION: CPT

## 2017-07-30 RX ORDER — LABETALOL 300 MG/1
300 TABLET, FILM COATED ORAL 3 TIMES DAILY
Qty: 42 TAB | Refills: 0 | Status: SHIPPED | OUTPATIENT
Start: 2017-07-30 | End: 2017-07-30

## 2017-07-30 RX ORDER — LABETALOL 300 MG/1
300 TABLET, FILM COATED ORAL 3 TIMES DAILY
Qty: 42 TAB | Refills: 0 | Status: SHIPPED | OUTPATIENT
Start: 2017-07-30

## 2017-07-30 RX ADMIN — LABETALOL HCL 300 MG: 200 TABLET, FILM COATED ORAL at 08:43

## 2017-07-30 RX ADMIN — ACETAMINOPHEN 650 MG: 325 TABLET ORAL at 00:21

## 2017-07-30 NOTE — DISCHARGE SUMMARY
Obstetrical Discharge Summary     Name: Mellissa Cortez MRN: [id number]  SSN: [id number]    YOB: 1990  Age: 32 y.o. Sex: female      Admit Date: 2017    Discharge Date: 2017     Admitting Physician: Barbara Nance MD     Attending Physician:  Jagjit Suero MD     Admission Diagnoses: Pre-Eclampsia with headache    Discharge Diagnoses: Pre-Eclampsia wit headache      Additional Diagnoses:   Hospital Problems  Date Reviewed: 2017          Codes Class Noted POA    Pregnancy ICD-10-CM: Z33.1  ICD-9-CM: V22.2  2014 Unknown             Lab Results   Component Value Date/Time    Rubella, External Immune 2017    GrBStrep, External NEGATIVE 2017       Immunization(s):   Immunization History   Administered Date(s) Administered    Tdap 2014, 2017        Hospital Course: Patient was admitted on 17 for Pre-eclampsia with headaches after  with midwife on 17. Pregnancy was complicated by Pre-Eclampsia with elevated BPs in 140s-150s on labetalol 200mg BID. Protein/creatinine ratio at delivery was 0.5. On BP follow up with midwife on  patient was found to have elevated BPs. Patient was given a home BP cuff and BP was found to be 166/108, after which patient was admitted to hospital on 17. During this admission, patient was switched from labetalol 200mg to 300mg PO TID and was given Tylenol PRN headache. Patient has denied any RUQ pain or vision disturbance on this admission. Blood Presures have ranged from 141-177/. After blood pressures stabilized in 140s/70s, patient was discharged home with Labetalol 300mg TID and plan for next day follow up in Dr. Marlene Good clinic with BP check.      Condition at Discharge:  Stable    Disposition:  Home    Patient Instructions:   Current Discharge Medication List      CONTINUE these medications which have CHANGED    Details   labetalol (NORMODYNE) 300 mg tablet Take 1 Tab by mouth three (3) times daily.  Qty: 90 Tab, Refills: 2         CONTINUE these medications which have NOT CHANGED    Details   docusate sodium (COLACE) 100 mg capsule Take 100 mg by mouth two (2) times a day. ferrous sulfate 325 mg (65 mg iron) tablet Take  by mouth Daily (before breakfast). cholecalciferol, vitamin D3, (VITAMIN D3) 2,000 unit tab Take 4,000 Int'l Units by mouth daily. PNV no.24-iron-folic acid-dha (PRENATAL DHA+COMPLETE PRENATAL) -300 mg-mcg-mg cmpk Take 30 mg/day by mouth daily. Indications: PREGNANCY             Reference my discharge instructions. No orders of the defined types were placed in this encounter.            Signed By:  Terrance Bowen MD    Family Medicine Resident

## 2017-07-30 NOTE — PROGRESS NOTES
7/30/2017  0700 am  SBAR report recd from [facility] [facility] 1788  0900 am  Dr Nadia Israel at nurses station, aware of pt. Plan to recheck bp after receiving am 300 mg labetalol and if stable patient will be discharged. 1025 am  Resident at nurses station working on pt's discharge. 1053 am  Resident at nurses station finalizing discharge paperwork. 1105 am  Discharge instructions reviewed with pt, including new labetalol dose of 300 mg TID per MD order. Pt to follow up with midwives tomorrow and have her bp rechecked in office Monday August 1st.  1108 am  RN called resident for labetalol med rx, resident stated he will come to nurses station to sign labetalol med rx  1120 am  Pt discharged home at this time. Pt's  and 2 children at bedside, will escort pt out. Pt denied any questions or needs at this time.

## 2017-07-30 NOTE — PROGRESS NOTES
Post-Partum Day Number 2 Progress Note    Wale Hartmann     Assessment: Doing well, post partum day 2    Plan:   1. Discharge home today  2. Follow up in office in  [address] [place] [address] with Lam Maurer MD  3. Post partum activity advised, diet as tolerated  4. Discharge Medications: ibuprofen, percocet and medications prior to admission    This patient has no babies on file. Patient doing well without significant complaint. Voiding without difficulty, normal lochia. Vitals:  Visit Vitals    /86    Pulse 73    Temp 98.6 °F (37 °C)    Resp 16    Ht 5' 4\" (1.626 m)    Wt 104.3 kg (230 lb)    SpO2 99%    Breastfeeding Yes    BMI 39.48 kg/m2     Temp (24hrs), Av.7 °F (37.1 °C), Min:98.5 °F (36.9 °C), Max:99.1 °F (37.3 °C)      Exam:         Patient without distress. Abdomen soft, fundus firm, nontender                 ower extremities are negative for swelling, cords or tenderness.     Labs:     Lab Results   Component Value Date/Time    WBC 4.7 2017 01:08 PM    WBC 6.5 2017 07:53 AM    WBC 7.1 2017 12:21 PM    WBC 7.1 2017 11:42 AM    WBC 6.6 2017 07:16 AM    WBC 7.1 2014 06:15 AM    WBC 8.0 2014 10:55 AM    WBC 7.0 2010 06:50 AM    HGB 11.8 2017 01:08 PM    HGB 10.3 2017 07:53 AM    HGB 10.7 2017 12:21 PM    HGB 11.4 2017 11:42 AM    HGB 12.4 2017 07:16 AM    HGB 10.7 2014 06:15 AM    HGB 10.9 2014 10:55 AM    HGB 10.5 2010 06:50 AM    HCT 37.8 2017 01:08 PM    HCT 32.2 2017 07:53 AM    HCT 32.7 2017 12:21 PM    HCT 34.8 2017 11:42 AM    HCT 37.5 2017 07:16 AM    HCT 32.3 2014 06:15 AM    HCT 33.4 2014 10:55 AM    HCT 32.0 2010 06:50 AM    PLATELET 675  01:08 PM    PLATELET 580  07:53 AM    PLATELET 304  12:21 PM    PLATELET 168  11:42 AM    PLATELET 243  07:16 AM    PLATELET 851  06:15 AM PLATELET 294 58/10/5816 10:55 AM    PLATELET 042 30/78/5855 06:50 AM    Hgb, External 12.3 02/25/2014    Hct, External 37.3 02/25/2014       Recent Results (from the past 24 hour(s))   CBC WITH AUTOMATED DIFF    Collection Time: 07/29/17  1:08 PM   Result Value Ref Range    WBC 4.7 3.6 - 11.0 K/uL    RBC 4.82 3.80 - 5.20 M/uL    HGB 11.8 11.5 - 16.0 g/dL    HCT 37.8 35.0 - 47.0 %    MCV 78.4 (L) 80.0 - 99.0 FL    MCH 24.5 (L) 26.0 - 34.0 PG    MCHC 31.2 30.0 - 36.5 g/dL    RDW 15.3 (H) 11.5 - 14.5 %    PLATELET 818 957 - 119 K/uL    NEUTROPHILS 54 32 - 75 %    LYMPHOCYTES 35 12 - 49 %    MONOCYTES 9 5 - 13 %    EOSINOPHILS 2 0 - 7 %    BASOPHILS 0 0 - 1 %    ABS. NEUTROPHILS 2.6 1.8 - 8.0 K/UL    ABS. LYMPHOCYTES 1.6 0.8 - 3.5 K/UL    ABS. MONOCYTES 0.4 0.0 - 1.0 K/UL    ABS. EOSINOPHILS 0.1 0.0 - 0.4 K/UL    ABS. BASOPHILS 0.0 0.0 - 0.1 K/UL   METABOLIC PANEL, COMPREHENSIVE    Collection Time: 07/29/17  1:08 PM   Result Value Ref Range    Sodium 143 136 - 145 mmol/L    Potassium 4.0 3.5 - 5.1 mmol/L    Chloride 106 97 - 108 mmol/L    CO2 28 21 - 32 mmol/L    Anion gap 9 5 - 15 mmol/L    Glucose 82 65 - 100 mg/dL    BUN 10 6 - 20 MG/DL    Creatinine 0.80 0.55 - 1.02 MG/DL    BUN/Creatinine ratio 13 12 - 20      GFR est AA >60 >60 ml/min/1.73m2    GFR est non-AA >60 >60 ml/min/1.73m2    Calcium 9.2 8.5 - 10.1 MG/DL    Bilirubin, total 0.3 0.2 - 1.0 MG/DL    ALT (SGPT) 29 12 - 78 U/L    AST (SGOT) 20 15 - 37 U/L    Alk. phosphatase 86 45 - 117 U/L    Protein, total 7.3 6.4 - 8.2 g/dL    Albumin 4.0 3.5 - 5.0 g/dL    Globulin 3.3 2.0 - 4.0 g/dL    A-G Ratio 1.2 1.1 - 2.2     PROTEIN/CREATININE RATIO, URINE    Collection Time: 07/29/17  2:30 PM   Result Value Ref Range    Protein, urine random <6 0.0 - 11.9 mg/dL    Creatinine, urine 29.05 mg/dL    Protein/Creat.  urine Ratio Cannot be calulated

## 2017-07-31 LAB
Lab: NORMAL
REFERENCE LAB,REFLB: NORMAL
TEST DESCRIPTION:,ATST: NORMAL

## 2017-09-11 ENCOUNTER — OFFICE VISIT (OUTPATIENT)
Dept: MIDWIFE SERVICES | Age: 27
End: 2017-09-11

## 2017-09-11 VITALS
HEIGHT: 64 IN | BODY MASS INDEX: 35.17 KG/M2 | SYSTOLIC BLOOD PRESSURE: 142 MMHG | WEIGHT: 206 LBS | DIASTOLIC BLOOD PRESSURE: 89 MMHG | HEART RATE: 69 BPM

## 2017-09-11 PROBLEM — O13.9 GESTATIONAL HTN: Status: RESOLVED | Noted: 2017-07-14 | Resolved: 2017-09-11

## 2017-09-11 RX ORDER — ACETAMINOPHEN AND CODEINE PHOSPHATE 120; 12 MG/5ML; MG/5ML
1 SOLUTION ORAL DAILY
Qty: 3 PACKAGE | Refills: 3 | Status: SHIPPED | OUTPATIENT
Start: 2017-09-11

## 2017-09-11 NOTE — PROGRESS NOTES
Franki Vincent was seen and evaluated with Ranjeet VALENTE and I agree with evaluation and assessment. In addition, patient was advised to continue with Labetalol 300 mg PO every day and to follow up with PCM in 2 months for BP evaluation and management. She verbalized understanding of instructions. Will use condoms for 1 week post starting Micronor ofr birth control.

## 2017-09-11 NOTE — PROGRESS NOTES
Post-Partum Visit :    Delivery Date: 17  Delivery Type:   Anesthesia: epidural  Provider: Oniel Gunderson CNM  Laceration: none  Infant name: James  Weight: 6lbs 14 oz  Prenatal complications: HTN  Labor/delivery complications:none    Lochia: resolved  LMP: none (nursing)  Stapleton Score: 2/30  Feeding method: breastfeeding  Birth control: micronor   Date of Last Pap: today    Feelings about birth: Feels good about birth      Delivery History:History of Present Illness:   Now 8 weeks postpartum. Doing well and reports no problems thus far in the postpartum period. She denies any vaginal pain, abdominal pain, or any problems with her breasts or nipples. She denies any symptoms of pp depression. PP Exam:   Blood pressure 142/89, pulse 69, height 5' 4\" (1.626 m), weight 206 lb (93.4 kg), last menstrual period 10/10/2016, currently breastfeeding. General: Alert, oriented, cooperative, in no acute distress   Thyroid: non-tender, not enlarged, no masses or nodules   Heart: S1S2, RRR without murmur   Lungs: CTA bilaterally   Breasts: lacatating, breast exam deferred   Abdomen: Soft, nontender, and nondistended  Vulva/Vagina: WNL; well healed   Uterus: Involuted, non-tender   Adnexa: Without palpable masses bilaterally       Assessment   PP exam of a lactating mother   Involution complete    Plan   Pap completed  --Continue PNV while breastfeeding. --Contraceptive counseling and will start micronor  - Return in one year for annual exam, or sooner if any symptoms of pp depression or any other problem.

## 2017-09-11 NOTE — PROGRESS NOTES
Chief Complaint   Patient presents with   [place] 40 PP     Visit Vitals    /89    Pulse 69    Ht 5' 4\" (1.626 m)    Wt 206 lb (93.4 kg)    LMP 10/10/2016 (Approximate)    Breastfeeding Yes    BMI 35.36 kg/m2     1. Have you been to the ER, urgent care clinic since your last visit? Hospitalized since your last visit? No    2. Have you seen or consulted any other health care providers outside of the [address] [place] [address] since your last visit? Include any pap smears or colon screening. No    Verbal order for Pap rflx HPV per [facility]    Thank you for choosing [facility]. We know you have many options when it comes to your healthcare; we appreciate that you picked us. Our goal is to provide exceptional service and world class care for every patient. You may receive a survey in the mail or by email asking for your feedback. Please take a few minutes to share your thoughts about your recent visit. Your comments helps us understand what we do well and what we can do better. To ensure confidentiality, this survey is administered by an independent third-party, [facility] [place] participation will help us to continue and improve the quality of care that we provide to you, your family, friends, and neighbors. Thank you!

## 2017-09-15 LAB
CYTOLOGIST CVX/VAG CYTO: ABNORMAL
CYTOLOGY CVX/VAG DOC THIN PREP: ABNORMAL
DX ICD CODE: ABNORMAL
DX ICD CODE: ABNORMAL
LABCORP, 190119: ABNORMAL
Lab: ABNORMAL
OTHER STN SPEC: ABNORMAL
PATH REPORT.FINAL DX SPEC: ABNORMAL
PATHOLOGIST CVX/VAG CYTO: ABNORMAL
STAT OF ADQ CVX/VAG CYTO-IMP: ABNORMAL

## 2017-09-19 NOTE — PROGRESS NOTES
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 Sharmila Lopez is no longer covered by her insurance. She will call to see if [facility] is on insurance plan. I informed her she will need colpo in about 6 weeks and will also send letter stating this.

## 2017-09-30 RX ORDER — LABETALOL 200 MG/1
TABLET, FILM COATED ORAL
Qty: 60 TAB | Refills: 1 | Status: SHIPPED | OUTPATIENT
Start: 2017-09-30

## 2022-03-18 PROBLEM — E55.9 VITAMIN D DEFICIENCY: Status: ACTIVE | Noted: 2017-02-06

## 2024-06-23 SDOH — HEALTH STABILITY: PHYSICAL HEALTH: ON AVERAGE, HOW MANY MINUTES DO YOU ENGAGE IN EXERCISE AT THIS LEVEL?: 60 MIN

## 2024-06-23 SDOH — HEALTH STABILITY: PHYSICAL HEALTH: ON AVERAGE, HOW MANY DAYS PER WEEK DO YOU ENGAGE IN MODERATE TO STRENUOUS EXERCISE (LIKE A BRISK WALK)?: 2 DAYS

## 2024-06-25 ENCOUNTER — OFFICE VISIT (OUTPATIENT)
Facility: CLINIC | Age: 34
End: 2024-06-25
Payer: COMMERCIAL

## 2024-06-25 VITALS
HEART RATE: 82 BPM | OXYGEN SATURATION: 100 % | TEMPERATURE: 98 F | SYSTOLIC BLOOD PRESSURE: 153 MMHG | WEIGHT: 201.4 LBS | HEIGHT: 64 IN | RESPIRATION RATE: 18 BRPM | DIASTOLIC BLOOD PRESSURE: 102 MMHG | BODY MASS INDEX: 34.38 KG/M2

## 2024-06-25 DIAGNOSIS — Z13.1 DIABETES MELLITUS SCREENING: ICD-10-CM

## 2024-06-25 DIAGNOSIS — Z11.59 ENCOUNTER FOR HEPATITIS C SCREENING TEST FOR LOW RISK PATIENT: ICD-10-CM

## 2024-06-25 DIAGNOSIS — Z13.220 SCREENING CHOLESTEROL LEVEL: ICD-10-CM

## 2024-06-25 DIAGNOSIS — I10 PRIMARY HYPERTENSION: ICD-10-CM

## 2024-06-25 DIAGNOSIS — F41.9 ANXIETY: ICD-10-CM

## 2024-06-25 DIAGNOSIS — Z83.3 FAMILY HISTORY OF DIABETES MELLITUS: ICD-10-CM

## 2024-06-25 DIAGNOSIS — Z76.89 ENCOUNTER TO ESTABLISH CARE: ICD-10-CM

## 2024-06-25 DIAGNOSIS — I10 PRIMARY HYPERTENSION: Primary | ICD-10-CM

## 2024-06-25 DIAGNOSIS — R45.851 SUICIDAL IDEATION: ICD-10-CM

## 2024-06-25 DIAGNOSIS — R41.840 DIFFICULTY CONCENTRATING: ICD-10-CM

## 2024-06-25 PROCEDURE — 3077F SYST BP >= 140 MM HG: CPT | Performed by: FAMILY MEDICINE

## 2024-06-25 PROCEDURE — 3080F DIAST BP >= 90 MM HG: CPT | Performed by: FAMILY MEDICINE

## 2024-06-25 PROCEDURE — 99204 OFFICE O/P NEW MOD 45 MIN: CPT | Performed by: FAMILY MEDICINE

## 2024-06-25 RX ORDER — SERTRALINE HYDROCHLORIDE 25 MG/1
25 TABLET, FILM COATED ORAL DAILY
Qty: 90 TABLET | Refills: 1 | Status: SHIPPED | OUTPATIENT
Start: 2024-06-25

## 2024-06-25 RX ORDER — MELOXICAM 15 MG/1
15 TABLET ORAL PRN
COMMUNITY

## 2024-06-25 RX ORDER — AMLODIPINE BESYLATE 5 MG/1
5 TABLET ORAL DAILY
Qty: 90 TABLET | Refills: 1 | Status: SHIPPED | OUTPATIENT
Start: 2024-06-25

## 2024-06-25 SDOH — ECONOMIC STABILITY: FOOD INSECURITY: WITHIN THE PAST 12 MONTHS, THE FOOD YOU BOUGHT JUST DIDN'T LAST AND YOU DIDN'T HAVE MONEY TO GET MORE.: NEVER TRUE

## 2024-06-25 SDOH — ECONOMIC STABILITY: FOOD INSECURITY: WITHIN THE PAST 12 MONTHS, YOU WORRIED THAT YOUR FOOD WOULD RUN OUT BEFORE YOU GOT MONEY TO BUY MORE.: NEVER TRUE

## 2024-06-25 SDOH — ECONOMIC STABILITY: INCOME INSECURITY: HOW HARD IS IT FOR YOU TO PAY FOR THE VERY BASICS LIKE FOOD, HOUSING, MEDICAL CARE, AND HEATING?: NOT HARD AT ALL

## 2024-06-25 SDOH — ECONOMIC STABILITY: HOUSING INSECURITY
IN THE LAST 12 MONTHS, WAS THERE A TIME WHEN YOU DID NOT HAVE A STEADY PLACE TO SLEEP OR SLEPT IN A SHELTER (INCLUDING NOW)?: NO

## 2024-06-25 ASSESSMENT — PATIENT HEALTH QUESTIONNAIRE - PHQ9
SUM OF ALL RESPONSES TO PHQ9 QUESTIONS 1 & 2: 2
2. FEELING DOWN, DEPRESSED OR HOPELESS: SEVERAL DAYS
SUM OF ALL RESPONSES TO PHQ QUESTIONS 1-9: 2
SUM OF ALL RESPONSES TO PHQ QUESTIONS 1-9: 2
1. LITTLE INTEREST OR PLEASURE IN DOING THINGS: SEVERAL DAYS
SUM OF ALL RESPONSES TO PHQ QUESTIONS 1-9: 2
SUM OF ALL RESPONSES TO PHQ QUESTIONS 1-9: 2

## 2024-06-25 NOTE — PROGRESS NOTES
Chief Complaint   Patient presents with    New Patient     BP (!) 153/102 (Site: Left Upper Arm, Position: Sitting)   Pulse 82   Temp 98 °F (36.7 °C) (Temporal)   Resp 18   Ht 1.626 m (5' 4\")   Wt 91.4 kg (201 lb 6.4 oz)   LMP 06/19/2024   SpO2 100%   BMI 34.57 kg/m²     \"Have you been to the ER, urgent care clinic since your last visit?  Hospitalized since your last visit?\"    NO    “Have you seen or consulted any other health care providers outside of [facility] since your last visit?”    NO     “Have you had a pap smear?”    NO    Date of last Cervical Cancer screen (HPV or PAP): 9/11/2017             Click Here for Release of Records Request

## 2024-06-25 NOTE — PROGRESS NOTES
Subjective  Chief Complaint   Patient presents with    New Patient     HPI:  Dandre Andersen is a 34 y.o. female with medical problems as listed below who presents to establish care. [facility] ([facility]  Former PCP Dr. Keith Lee.     Past medical history: anxiety, suicidal ideation, hypertension, history of preeclampsia, ankle impingement  Medications: meloxicam PRN (for ankle pain)  Allergies: NKDA  Specialists: None currently. Previously saw [facility] in [place].   Surgical history: None  Family history: DM, HTN (mom). HTN (matGM). Prediabetes (sister).   Social history: No tobacco, occasional alcohol, no drugs. She walks 1-2 times per week.    Cervical cancer screening: Last Pap possibly last year. Uncertain of results.     Anxiety/history of SI: Since childhood, but has gotten worse into adulthood. Never been on medication before. She just started seeing a therapist about one month ago.     HTN: First diagnosed with HTN during pregnancy about 15 years ago. Hasn't been on BP meds in the last 12 years. Has BP machine at home and SBP was 150's consistently.     Difficulty concentrating: Reports that she loses focus easily when doing household chores, especially if there's a lot going on around her.     Patient Active Problem List   Diagnosis    Vitamin D deficiency    Benign essential HTN, chronic, antepartum    Anxiety    Suicidal ideation     Family History   Problem Relation Age of Onset    Diabetes Mother     High Blood Pressure Mother     Other Sister         prediabetes    Hypertension Maternal Grandmother     Breast Cancer Maternal Aunt     Alcohol Abuse Maternal Uncle       Social History     Tobacco Use    Smoking status: Never    Smokeless tobacco: Never   Vaping Use    Vaping Use: Never used   Substance Use Topics    Alcohol use: Never    Drug use: Never     Current Outpatient Medications on File Prior to Visit   Medication Sig Dispense Refill    meloxicam (MOBIC) 15 MG tablet Take 1 tablet by mouth as

## 2024-06-28 LAB
ALBUMIN SERPL-MCNC: 4.3 G/DL (ref 3.9–4.9)
ALP SERPL-CCNC: 53 IU/L (ref 44–121)
ALT SERPL-CCNC: 51 IU/L (ref 0–32)
AST SERPL-CCNC: 30 IU/L (ref 0–40)
BILIRUB SERPL-MCNC: 0.2 MG/DL (ref 0–1.2)
BUN SERPL-MCNC: 8 MG/DL (ref 6–20)
BUN/CREAT SERPL: 10 (ref 9–23)
CALCIUM SERPL-MCNC: 9.2 MG/DL (ref 8.7–10.2)
CHLORIDE SERPL-SCNC: 103 MMOL/L (ref 96–106)
CHOLEST SERPL-MCNC: 182 MG/DL (ref 100–199)
CO2 SERPL-SCNC: 26 MMOL/L (ref 20–29)
CREAT SERPL-MCNC: 0.77 MG/DL (ref 0.57–1)
EGFRCR SERPLBLD CKD-EPI 2021: 102 ML/MIN/1.73
ERYTHROCYTE [DISTWIDTH] IN BLOOD BY AUTOMATED COUNT: 15.5 % (ref 11.7–15.4)
GLOBULIN SER CALC-MCNC: 2.7 G/DL (ref 1.5–4.5)
GLUCOSE SERPL-MCNC: 95 MG/DL (ref 70–99)
HBA1C MFR BLD: 6.1 % (ref 4.8–5.6)
HCT VFR BLD AUTO: 34.5 % (ref 34–46.6)
HCV IGG SERPL QL IA: NON REACTIVE
HDLC SERPL-MCNC: 60 MG/DL
HGB BLD-MCNC: 10.4 G/DL (ref 11.1–15.9)
LDLC SERPL CALC-MCNC: 111 MG/DL (ref 0–99)
MCH RBC QN AUTO: 22.2 PG (ref 26.6–33)
MCHC RBC AUTO-ENTMCNC: 30.1 G/DL (ref 31.5–35.7)
MCV RBC AUTO: 74 FL (ref 79–97)
PLATELET # BLD AUTO: 207 X10E3/UL (ref 150–450)
POTASSIUM SERPL-SCNC: 4.2 MMOL/L (ref 3.5–5.2)
PROT SERPL-MCNC: 7 G/DL (ref 6–8.5)
RBC # BLD AUTO: 4.68 X10E6/UL (ref 3.77–5.28)
SODIUM SERPL-SCNC: 139 MMOL/L (ref 134–144)
TRIGL SERPL-MCNC: 58 MG/DL (ref 0–149)
VLDLC SERPL CALC-MCNC: 11 MG/DL (ref 5–40)
WBC # BLD AUTO: 4.1 X10E3/UL (ref 3.4–10.8)

## 2024-07-08 PROBLEM — F41.9 ANXIETY: Status: ACTIVE | Noted: 2024-07-08

## 2024-07-08 PROBLEM — R45.851 SUICIDAL IDEATION: Status: ACTIVE | Noted: 2024-07-08

## 2024-07-10 ENCOUNTER — TELEPHONE (OUTPATIENT)
Facility: CLINIC | Age: 34
End: 2024-07-10

## 2024-07-10 DIAGNOSIS — R73.03 PREDIABETES: ICD-10-CM

## 2024-07-10 DIAGNOSIS — E66.09 CLASS 1 OBESITY DUE TO EXCESS CALORIES WITH SERIOUS COMORBIDITY AND BODY MASS INDEX (BMI) OF 34.0 TO 34.9 IN ADULT: ICD-10-CM

## 2024-07-10 DIAGNOSIS — D64.9 ANEMIA, UNSPECIFIED TYPE: ICD-10-CM

## 2024-07-10 DIAGNOSIS — R74.01 ELEVATED ALANINE AMINOTRANSFERASE (ALT) LEVEL: ICD-10-CM

## 2024-07-10 DIAGNOSIS — E78.00 PURE HYPERCHOLESTEROLEMIA: Primary | ICD-10-CM

## 2024-07-10 NOTE — TELEPHONE ENCOUNTER
Patient called and would like to get her lab results.  Please call her at [phone].  Patient would prefer a call instead of putting lab results into my chart.

## 2024-07-11 ENCOUNTER — TELEPHONE (OUTPATIENT)
Facility: CLINIC | Age: 34
End: 2024-07-11

## 2024-07-11 DIAGNOSIS — D64.9 ANEMIA, UNSPECIFIED TYPE: ICD-10-CM

## 2024-07-11 PROBLEM — R73.03 PREDIABETES: Status: ACTIVE | Noted: 2024-07-11

## 2024-07-11 PROBLEM — E66.811 CLASS 1 OBESITY DUE TO EXCESS CALORIES WITH SERIOUS COMORBIDITY AND BODY MASS INDEX (BMI) OF 34.0 TO 34.9 IN ADULT: Status: ACTIVE | Noted: 2024-07-11

## 2024-07-11 PROBLEM — E66.09 CLASS 1 OBESITY DUE TO EXCESS CALORIES WITH SERIOUS COMORBIDITY AND BODY MASS INDEX (BMI) OF 34.0 TO 34.9 IN ADULT: Status: ACTIVE | Noted: 2024-07-11

## 2024-07-11 PROBLEM — E78.00 PURE HYPERCHOLESTEROLEMIA: Status: ACTIVE | Noted: 2024-07-11

## 2024-07-11 PROBLEM — R74.01 ELEVATED ALANINE AMINOTRANSFERASE (ALT) LEVEL: Status: ACTIVE | Noted: 2024-07-11

## 2024-07-12 ENCOUNTER — PATIENT MESSAGE (OUTPATIENT)
Facility: CLINIC | Age: 34
End: 2024-07-12

## 2024-07-15 NOTE — TELEPHONE ENCOUNTER
From: Dandre Andersen  To: Dr. Cecilia Ivey  Sent: 7/12/2024 5:00 PM EDT  Subject: Low Iron     Good evening, in regards to the low iron I wanted to mention that my youngest son has Thalassemia. I personally have never been tested but given that it is genetic and I seem to consistently have low iron I think that may be the culprit. I am assuming the test you ordered will check for that but I wanted to make you aware of the possibility in the event that it does not.

## 2024-07-17 LAB
ERYTHROCYTE [DISTWIDTH] IN BLOOD BY AUTOMATED COUNT: 16.3 % (ref 11.7–15.4)
FERRITIN SERPL-MCNC: 7 NG/ML (ref 15–150)
HCT VFR BLD AUTO: 35.3 % (ref 34–46.6)
HGB BLD-MCNC: 10.7 G/DL (ref 11.1–15.9)
IRON SATN MFR SERPL: 5 % (ref 15–55)
IRON SERPL-MCNC: 19 UG/DL (ref 27–159)
MCH RBC QN AUTO: 22 PG (ref 26.6–33)
MCHC RBC AUTO-ENTMCNC: 30.3 G/DL (ref 31.5–35.7)
MCV RBC AUTO: 73 FL (ref 79–97)
PLATELET # BLD AUTO: 194 X10E3/UL (ref 150–450)
RBC # BLD AUTO: 4.87 X10E6/UL (ref 3.77–5.28)
RETICS/RBC NFR AUTO: 0.8 % (ref 0.6–2.6)
TIBC SERPL-MCNC: 421 UG/DL (ref 250–450)
UIBC SERPL-MCNC: 402 UG/DL (ref 131–425)
WBC # BLD AUTO: 3.5 X10E3/UL (ref 3.4–10.8)

## 2024-07-19 ENCOUNTER — HOSPITAL ENCOUNTER (OUTPATIENT)
Facility: HOSPITAL | Age: 34
End: 2024-07-19
Attending: FAMILY MEDICINE
Payer: COMMERCIAL

## 2024-07-19 DIAGNOSIS — R74.01 ELEVATED ALANINE AMINOTRANSFERASE (ALT) LEVEL: ICD-10-CM

## 2024-07-19 PROCEDURE — 76705 ECHO EXAM OF ABDOMEN: CPT

## 2024-09-03 ENCOUNTER — OFFICE VISIT (OUTPATIENT)
Facility: CLINIC | Age: 34
End: 2024-09-03
Payer: COMMERCIAL

## 2024-09-03 VITALS
HEIGHT: 64 IN | DIASTOLIC BLOOD PRESSURE: 88 MMHG | SYSTOLIC BLOOD PRESSURE: 137 MMHG | HEART RATE: 85 BPM | WEIGHT: 199.2 LBS | RESPIRATION RATE: 16 BRPM | BODY MASS INDEX: 34.01 KG/M2 | OXYGEN SATURATION: 100 % | TEMPERATURE: 97.5 F

## 2024-09-03 DIAGNOSIS — L81.9 HYPERPIGMENTATION OF SKIN: ICD-10-CM

## 2024-09-03 DIAGNOSIS — R61 HYPERHIDROSIS: ICD-10-CM

## 2024-09-03 DIAGNOSIS — F41.9 ANXIETY: ICD-10-CM

## 2024-09-03 DIAGNOSIS — I10 PRIMARY HYPERTENSION: Primary | ICD-10-CM

## 2024-09-03 PROBLEM — K76.0 HEPATIC STEATOSIS: Status: ACTIVE | Noted: 2024-07-11

## 2024-09-03 PROCEDURE — 3079F DIAST BP 80-89 MM HG: CPT | Performed by: FAMILY MEDICINE

## 2024-09-03 PROCEDURE — 3075F SYST BP GE 130 - 139MM HG: CPT | Performed by: FAMILY MEDICINE

## 2024-09-03 PROCEDURE — 99214 OFFICE O/P EST MOD 30 MIN: CPT | Performed by: FAMILY MEDICINE

## 2024-09-03 RX ORDER — TRETINOIN 0.5 MG/G
CREAM TOPICAL
Qty: 45 G | Refills: 1 | Status: SHIPPED | OUTPATIENT
Start: 2024-09-03 | End: 2024-10-03

## 2024-09-03 ASSESSMENT — PATIENT HEALTH QUESTIONNAIRE - PHQ9
SUM OF ALL RESPONSES TO PHQ QUESTIONS 1-9: 0
2. FEELING DOWN, DEPRESSED OR HOPELESS: NOT AT ALL
SUM OF ALL RESPONSES TO PHQ QUESTIONS 1-9: 0
SUM OF ALL RESPONSES TO PHQ9 QUESTIONS 1 & 2: 0
SUM OF ALL RESPONSES TO PHQ QUESTIONS 1-9: 0
SUM OF ALL RESPONSES TO PHQ QUESTIONS 1-9: 0
1. LITTLE INTEREST OR PLEASURE IN DOING THINGS: NOT AT ALL

## 2024-09-09 ENCOUNTER — PATIENT MESSAGE (OUTPATIENT)
Facility: CLINIC | Age: 34
End: 2024-09-09

## 2024-10-17 ENCOUNTER — TELEPHONE (OUTPATIENT)
Facility: CLINIC | Age: 34
End: 2024-10-17

## 2024-11-12 ENCOUNTER — OFFICE VISIT (OUTPATIENT)
Facility: CLINIC | Age: 34
End: 2024-11-12
Payer: COMMERCIAL

## 2024-11-12 VITALS
OXYGEN SATURATION: 98 % | TEMPERATURE: 98.9 F | SYSTOLIC BLOOD PRESSURE: 116 MMHG | DIASTOLIC BLOOD PRESSURE: 75 MMHG | RESPIRATION RATE: 16 BRPM | BODY MASS INDEX: 33.77 KG/M2 | HEIGHT: 64 IN | HEART RATE: 64 BPM | WEIGHT: 197.8 LBS

## 2024-11-12 DIAGNOSIS — D50.9 IRON DEFICIENCY ANEMIA, UNSPECIFIED IRON DEFICIENCY ANEMIA TYPE: ICD-10-CM

## 2024-11-12 DIAGNOSIS — F41.9 ANXIETY AND DEPRESSION: Primary | ICD-10-CM

## 2024-11-12 DIAGNOSIS — F32.A ANXIETY AND DEPRESSION: Primary | ICD-10-CM

## 2024-11-12 DIAGNOSIS — R61 HYPERHIDROSIS: ICD-10-CM

## 2024-11-12 PROCEDURE — 3074F SYST BP LT 130 MM HG: CPT | Performed by: FAMILY MEDICINE

## 2024-11-12 PROCEDURE — 99214 OFFICE O/P EST MOD 30 MIN: CPT | Performed by: FAMILY MEDICINE

## 2024-11-12 PROCEDURE — 3078F DIAST BP <80 MM HG: CPT | Performed by: FAMILY MEDICINE

## 2024-11-12 RX ORDER — GLYCOPYRROLATE 1 MG/1
1 TABLET ORAL 2 TIMES DAILY
Qty: 180 TABLET | Refills: 1 | Status: SHIPPED | OUTPATIENT
Start: 2024-11-12

## 2024-11-12 RX ORDER — SERTRALINE HYDROCHLORIDE 100 MG/1
100 TABLET, FILM COATED ORAL DAILY
Qty: 90 TABLET | Refills: 1 | Status: SHIPPED | OUTPATIENT
Start: 2024-11-12

## 2024-11-12 NOTE — PROGRESS NOTES
\"Have you been to the ER, urgent care clinic since your last visit?  Hospitalized since your last visit?\"    NO    “Have you seen or consulted any other health care providers outside our system since your last visit?”    NO     “Have you had a pap smear?”    NO    Date of last Cervical Cancer screen (HPV or PAP): 9/11/2017       Chief Complaint   Patient presents with    Follow-up     /75 (Site: Left Upper Arm, Position: Sitting, Cuff Size: Large Adult)   Pulse 64   Temp 98.9 °F (37.2 °C) (Temporal)   Resp 16   Ht 1.626 m (5' 4\")   Wt 89.7 kg (197 lb 12.8 oz)   SpO2 98%   BMI 33.95 kg/m²

## 2024-11-12 NOTE — PROGRESS NOTES
Subjective  Chief Complaint   Patient presents with    Follow-up     HPI:  Dandre Andersen is a 35 y.o. female with medical problems as listed below who presents for follow up of chronic conditions.     Patient would like to use Robinul again. She has tried it in the past and it worked. She was unable to get the Drysol covered by insurance.     Anxiety: Feels better on sertraline. Still doesn't feel like herself. Seeing counseling once weekly since July/August.     Iron deficiency anemia: Son has thalassemia. Admits to heavy periods for first 3 days of period. Regular. Has been taking iron since August.     Patient Active Problem List   Diagnosis    Vitamin D deficiency    Primary hypertension    Anxiety    Suicidal ideation    Pure hypercholesterolemia    Prediabetes    Class 1 obesity due to excess calories with serious comorbidity and body mass index (BMI) of 34.0 to 34.9 in adult    Hepatic steatosis    Anemia    Hyperhidrosis     Family History   Problem Relation Age of Onset    Diabetes Mother     High Blood Pressure Mother     Other Sister         prediabetes    Hypertension Maternal Grandmother     Breast Cancer Maternal Aunt     Alcohol Abuse Maternal Uncle       Social History     Tobacco Use    Smoking status: Never    Smokeless tobacco: Never   Vaping Use    Vaping status: Never Used   Substance Use Topics    Alcohol use: Never    Drug use: Never     Current Outpatient Medications on File Prior to Visit   Medication Sig Dispense Refill    ferrous sulfate (IRON 325) 325 (65 Fe) MG tablet Take 1 tablet by mouth daily (with breakfast) 90 tablet 1    amLODIPine (NORVASC) 5 MG tablet Take 1 tablet by mouth daily 90 tablet 1    meloxicam (MOBIC) 15 MG tablet Take 1 tablet by mouth as needed for Pain (Patient not taking: Reported on 9/3/2024)       No current facility-administered medications on file prior to visit.     No Known Allergies  Review of Systems   Constitutional: Negative.    Psychiatric/Behavioral:

## 2024-11-13 LAB
ERYTHROCYTE [DISTWIDTH] IN BLOOD BY AUTOMATED COUNT: 19.9 % (ref 11.7–15.4)
FERRITIN SERPL-MCNC: 15 NG/ML (ref 15–150)
HCT VFR BLD AUTO: 38.6 % (ref 34–46.6)
HGB BLD-MCNC: 11.6 G/DL (ref 11.1–15.9)
IRON SATN MFR SERPL: 69 % (ref 15–55)
IRON SERPL-MCNC: 244 UG/DL (ref 27–159)
MCH RBC QN AUTO: 24 PG (ref 26.6–33)
MCHC RBC AUTO-ENTMCNC: 30.1 G/DL (ref 31.5–35.7)
MCV RBC AUTO: 80 FL (ref 79–97)
PLATELET # BLD AUTO: 188 X10E3/UL (ref 150–450)
RBC # BLD AUTO: 4.84 X10E6/UL (ref 3.77–5.28)
TIBC SERPL-MCNC: 353 UG/DL (ref 250–450)
UIBC SERPL-MCNC: 109 UG/DL (ref 131–425)
WBC # BLD AUTO: 4.1 X10E3/UL (ref 3.4–10.8)

## 2024-12-03 ENCOUNTER — PATIENT MESSAGE (OUTPATIENT)
Facility: CLINIC | Age: 34
End: 2024-12-03

## 2025-02-11 DIAGNOSIS — I10 PRIMARY HYPERTENSION: ICD-10-CM

## 2025-02-12 DIAGNOSIS — I10 PRIMARY HYPERTENSION: ICD-10-CM

## 2025-02-12 RX ORDER — AMLODIPINE BESYLATE 5 MG/1
5 TABLET ORAL DAILY
Qty: 90 TABLET | Refills: 1 | Status: SHIPPED | OUTPATIENT
Start: 2025-02-12 | End: 2025-02-13

## 2025-02-12 NOTE — TELEPHONE ENCOUNTER
Requested Prescriptions     Pending Prescriptions Disp Refills    amLODIPine (NORVASC) 5 MG tablet 90 tablet 1     Sig: Take 1 tablet by mouth daily

## 2025-02-12 NOTE — TELEPHONE ENCOUNTER
Requested Prescriptions     Pending Prescriptions Disp Refills    amLODIPine (NORVASC) 5 MG tablet [Pharmacy Med Name: AMLODIPINE BESYLATE 5 MG TAB] 30 tablet 5     Sig: TAKE 1 TABLET BY MOUTH EVERY DAY

## 2025-02-13 RX ORDER — AMLODIPINE BESYLATE 5 MG/1
5 TABLET ORAL DAILY
Qty: 90 TABLET | Refills: 1 | Status: SHIPPED | OUTPATIENT
Start: 2025-02-13

## 2025-04-20 SDOH — ECONOMIC STABILITY: FOOD INSECURITY: WITHIN THE PAST 12 MONTHS, THE FOOD YOU BOUGHT JUST DIDN'T LAST AND YOU DIDN'T HAVE MONEY TO GET MORE.: NEVER TRUE

## 2025-04-20 SDOH — ECONOMIC STABILITY: TRANSPORTATION INSECURITY
IN THE PAST 12 MONTHS, HAS THE LACK OF TRANSPORTATION KEPT YOU FROM MEDICAL APPOINTMENTS OR FROM GETTING MEDICATIONS?: NO

## 2025-04-20 SDOH — ECONOMIC STABILITY: FOOD INSECURITY: WITHIN THE PAST 12 MONTHS, YOU WORRIED THAT YOUR FOOD WOULD RUN OUT BEFORE YOU GOT MONEY TO BUY MORE.: NEVER TRUE

## 2025-04-20 SDOH — ECONOMIC STABILITY: INCOME INSECURITY: IN THE LAST 12 MONTHS, WAS THERE A TIME WHEN YOU WERE NOT ABLE TO PAY THE MORTGAGE OR RENT ON TIME?: NO

## 2025-04-20 SDOH — ECONOMIC STABILITY: TRANSPORTATION INSECURITY
IN THE PAST 12 MONTHS, HAS LACK OF TRANSPORTATION KEPT YOU FROM MEETINGS, WORK, OR FROM GETTING THINGS NEEDED FOR DAILY LIVING?: NO

## 2025-04-21 ENCOUNTER — OFFICE VISIT (OUTPATIENT)
Facility: CLINIC | Age: 35
End: 2025-04-21
Payer: COMMERCIAL

## 2025-04-21 VITALS
OXYGEN SATURATION: 99 % | HEIGHT: 64 IN | DIASTOLIC BLOOD PRESSURE: 89 MMHG | BODY MASS INDEX: 36.02 KG/M2 | TEMPERATURE: 97.8 F | HEART RATE: 71 BPM | SYSTOLIC BLOOD PRESSURE: 139 MMHG | WEIGHT: 211 LBS | RESPIRATION RATE: 18 BRPM

## 2025-04-21 DIAGNOSIS — R53.83 FATIGUE, UNSPECIFIED TYPE: ICD-10-CM

## 2025-04-21 DIAGNOSIS — F32.A ANXIETY AND DEPRESSION: Primary | ICD-10-CM

## 2025-04-21 DIAGNOSIS — E78.00 PURE HYPERCHOLESTEROLEMIA: ICD-10-CM

## 2025-04-21 DIAGNOSIS — F41.9 ANXIETY AND DEPRESSION: Primary | ICD-10-CM

## 2025-04-21 DIAGNOSIS — D50.9 IRON DEFICIENCY ANEMIA, UNSPECIFIED IRON DEFICIENCY ANEMIA TYPE: ICD-10-CM

## 2025-04-21 DIAGNOSIS — I10 PRIMARY HYPERTENSION: ICD-10-CM

## 2025-04-21 DIAGNOSIS — E55.9 VITAMIN D DEFICIENCY: ICD-10-CM

## 2025-04-21 DIAGNOSIS — G47.9 SLEEPING DIFFICULTY: ICD-10-CM

## 2025-04-21 DIAGNOSIS — K76.0 HEPATIC STEATOSIS: ICD-10-CM

## 2025-04-21 DIAGNOSIS — R61 HYPERHIDROSIS: ICD-10-CM

## 2025-04-21 DIAGNOSIS — R06.83 SNORING: ICD-10-CM

## 2025-04-21 DIAGNOSIS — R73.03 PREDIABETES: ICD-10-CM

## 2025-04-21 PROBLEM — E66.812 CLASS 2 SEVERE OBESITY DUE TO EXCESS CALORIES WITH SERIOUS COMORBIDITY AND BODY MASS INDEX (BMI) OF 36.0 TO 36.9 IN ADULT (HCC): Status: ACTIVE | Noted: 2024-07-11

## 2025-04-21 PROBLEM — F33.1 MODERATE EPISODE OF RECURRENT MAJOR DEPRESSIVE DISORDER (HCC): Status: ACTIVE | Noted: 2025-04-21

## 2025-04-21 PROBLEM — E66.01 CLASS 2 SEVERE OBESITY DUE TO EXCESS CALORIES WITH SERIOUS COMORBIDITY AND BODY MASS INDEX (BMI) OF 36.0 TO 36.9 IN ADULT (HCC): Status: ACTIVE | Noted: 2024-07-11

## 2025-04-21 PROCEDURE — 3079F DIAST BP 80-89 MM HG: CPT | Performed by: FAMILY MEDICINE

## 2025-04-21 PROCEDURE — 99214 OFFICE O/P EST MOD 30 MIN: CPT | Performed by: FAMILY MEDICINE

## 2025-04-21 PROCEDURE — 3075F SYST BP GE 130 - 139MM HG: CPT | Performed by: FAMILY MEDICINE

## 2025-04-21 RX ORDER — FERROUS SULFATE 325(65) MG
325 TABLET ORAL
Qty: 90 TABLET | Refills: 1 | Status: SHIPPED | OUTPATIENT
Start: 2025-04-21

## 2025-04-21 RX ORDER — SERTRALINE HYDROCHLORIDE 100 MG/1
100 TABLET, FILM COATED ORAL DAILY
Qty: 90 TABLET | Refills: 1 | Status: SHIPPED | OUTPATIENT
Start: 2025-04-21

## 2025-04-21 RX ORDER — AMLODIPINE BESYLATE 5 MG/1
5 TABLET ORAL DAILY
Qty: 90 TABLET | Refills: 1 | Status: SHIPPED | OUTPATIENT
Start: 2025-04-21

## 2025-04-21 ASSESSMENT — PATIENT HEALTH QUESTIONNAIRE - PHQ9
3. TROUBLE FALLING OR STAYING ASLEEP: NEARLY EVERY DAY
6. FEELING BAD ABOUT YOURSELF - OR THAT YOU ARE A FAILURE OR HAVE LET YOURSELF OR YOUR FAMILY DOWN: SEVERAL DAYS
2. FEELING DOWN, DEPRESSED OR HOPELESS: SEVERAL DAYS
5. POOR APPETITE OR OVEREATING: SEVERAL DAYS
7. TROUBLE CONCENTRATING ON THINGS, SUCH AS READING THE NEWSPAPER OR WATCHING TELEVISION: NEARLY EVERY DAY
SUM OF ALL RESPONSES TO PHQ QUESTIONS 1-9: 12
4. FEELING TIRED OR HAVING LITTLE ENERGY: NEARLY EVERY DAY
SUM OF ALL RESPONSES TO PHQ QUESTIONS 1-9: 12
1. LITTLE INTEREST OR PLEASURE IN DOING THINGS: NOT AT ALL
SUM OF ALL RESPONSES TO PHQ QUESTIONS 1-9: 12
8. MOVING OR SPEAKING SO SLOWLY THAT OTHER PEOPLE COULD HAVE NOTICED. OR THE OPPOSITE, BEING SO FIGETY OR RESTLESS THAT YOU HAVE BEEN MOVING AROUND A LOT MORE THAN USUAL: NOT AT ALL
9. THOUGHTS THAT YOU WOULD BE BETTER OFF DEAD, OR OF HURTING YOURSELF: NOT AT ALL
SUM OF ALL RESPONSES TO PHQ QUESTIONS 1-9: 12
10. IF YOU CHECKED OFF ANY PROBLEMS, HOW DIFFICULT HAVE THESE PROBLEMS MADE IT FOR YOU TO DO YOUR WORK, TAKE CARE OF THINGS AT HOME, OR GET ALONG WITH OTHER PEOPLE: VERY DIFFICULT

## 2025-04-21 NOTE — PROGRESS NOTES
\"Have you been to the ER, urgent care clinic since your last visit?  Hospitalized since your last visit?\"    NO    “Have you seen or consulted any other health care providers outside our system since your last visit?”    NO     “Have you had a pap smear?”    NO    Date of last Cervical Cancer screen (HPV or PAP): 9/11/2017     Chief Complaint   Patient presents with    Follow-up     5 month f/u - Anxiety and Depression      /89 (BP Site: Right Upper Arm, Patient Position: Sitting, BP Cuff Size: Large Adult)   Pulse 71   Temp 97.8 °F (36.6 °C) (Temporal)   Resp 18   Ht 1.626 m (5' 4\")   Wt 95.7 kg (211 lb)   SpO2 99%   BMI 36.22 kg/m²

## 2025-04-21 NOTE — PROGRESS NOTES
Subjective  Chief Complaint   Patient presents with    Follow-up     5 month f/u - Anxiety and Depression      HPI:  Danrde Andersen is a 35 y.o. female with medical problems as listed below who presents for follow up of chronic conditions.     Anxiety and depression: Taking sertraline which was increased at last visit in November. Her counselor left since our last appointment and so she has been without counseling for the last 2 months. She thinks this is why she doesn't feel her anxiety and depression are as well controlled. She would like to increase the sertraline while she continues to search for a counselor.     Iron deficiency anemia: Taking iron supplement and tolerating. Requesting refills.     Prediabetes: Last A1c 6.1.     Hyperhidrosis: Taking glycopyrrolate which is helping. She has noticed and can cause her some constipation.     HTN: Taking amlodipine. Requesting refills.     Hepatic steatosis: Due for labs.     Sleep difficulty: Difficult to stay asleep. Was looking into melatonin but read that she should not take it long term. She has been told she snores. She is wondering if she might have sleep apnea.       Patient Active Problem List   Diagnosis    Vitamin D deficiency    Primary hypertension    Anxiety    Suicidal ideation    Pure hypercholesterolemia    Prediabetes    Class 2 severe obesity due to excess calories with serious comorbidity and body mass index (BMI) of 36.0 to 36.9 in adult    Hepatic steatosis    Iron deficiency anemia    Hyperhidrosis    Moderate episode of recurrent major depressive disorder (HCC)     Family History   Problem Relation Age of Onset    Diabetes Mother     High Blood Pressure Mother     Other Sister         prediabetes    Hypertension Maternal Grandmother     Breast Cancer Maternal Aunt     Alcohol Abuse Maternal Uncle       Social History     Tobacco Use    Smoking status: Never    Smokeless tobacco: Never   Vaping Use    Vaping status: Never Used   Substance Use

## 2025-04-22 LAB
25(OH)D3+25(OH)D2 SERPL-MCNC: 24.6 NG/ML (ref 30–100)
ALBUMIN SERPL-MCNC: 4.3 G/DL (ref 3.9–4.9)
ALP SERPL-CCNC: 50 IU/L (ref 44–121)
ALT SERPL-CCNC: 22 IU/L (ref 0–32)
AST SERPL-CCNC: 20 IU/L (ref 0–40)
BILIRUB SERPL-MCNC: <0.2 MG/DL (ref 0–1.2)
BUN SERPL-MCNC: 12 MG/DL (ref 6–20)
BUN/CREAT SERPL: 18 (ref 9–23)
CALCIUM SERPL-MCNC: 8.9 MG/DL (ref 8.7–10.2)
CHLORIDE SERPL-SCNC: 103 MMOL/L (ref 96–106)
CHOLEST SERPL-MCNC: 187 MG/DL (ref 100–199)
CO2 SERPL-SCNC: 24 MMOL/L (ref 20–29)
CREAT SERPL-MCNC: 0.68 MG/DL (ref 0.57–1)
EGFRCR SERPLBLD CKD-EPI 2021: 115 ML/MIN/1.73
ERYTHROCYTE [DISTWIDTH] IN BLOOD BY AUTOMATED COUNT: 14.6 % (ref 11.7–15.4)
FERRITIN SERPL-MCNC: 21 NG/ML (ref 15–150)
GLOBULIN SER CALC-MCNC: 2.4 G/DL (ref 1.5–4.5)
GLUCOSE SERPL-MCNC: 88 MG/DL (ref 70–99)
HBA1C MFR BLD: 5.7 % (ref 4.8–5.6)
HCT VFR BLD AUTO: 39 % (ref 34–46.6)
HDLC SERPL-MCNC: 60 MG/DL
HGB BLD-MCNC: 12.1 G/DL (ref 11.1–15.9)
IRON SATN MFR SERPL: 49 % (ref 15–55)
IRON SERPL-MCNC: 175 UG/DL (ref 27–159)
LDLC SERPL CALC-MCNC: 117 MG/DL (ref 0–99)
MCH RBC QN AUTO: 26 PG (ref 26.6–33)
MCHC RBC AUTO-ENTMCNC: 31 G/DL (ref 31.5–35.7)
MCV RBC AUTO: 84 FL (ref 79–97)
PLATELET # BLD AUTO: 182 X10E3/UL (ref 150–450)
POTASSIUM SERPL-SCNC: 4.2 MMOL/L (ref 3.5–5.2)
PROT SERPL-MCNC: 6.7 G/DL (ref 6–8.5)
RBC # BLD AUTO: 4.65 X10E6/UL (ref 3.77–5.28)
SODIUM SERPL-SCNC: 138 MMOL/L (ref 134–144)
TIBC SERPL-MCNC: 357 UG/DL (ref 250–450)
TRIGL SERPL-MCNC: 52 MG/DL (ref 0–149)
UIBC SERPL-MCNC: 182 UG/DL (ref 131–425)
VLDLC SERPL CALC-MCNC: 10 MG/DL (ref 5–40)
WBC # BLD AUTO: 4.3 X10E3/UL (ref 3.4–10.8)

## 2025-05-28 ENCOUNTER — TELEPHONE (OUTPATIENT)
Facility: CLINIC | Age: 35
End: 2025-05-28

## 2025-05-28 NOTE — TELEPHONE ENCOUNTER
Patient called requesting lab results, I advised patient I would forward the message to her provider and call her with the results once they have been dictated.

## 2025-05-29 ENCOUNTER — RESULTS FOLLOW-UP (OUTPATIENT)
Facility: CLINIC | Age: 35
End: 2025-05-29